# Patient Record
Sex: MALE | Race: BLACK OR AFRICAN AMERICAN | NOT HISPANIC OR LATINO | Employment: OTHER | URBAN - METROPOLITAN AREA
[De-identification: names, ages, dates, MRNs, and addresses within clinical notes are randomized per-mention and may not be internally consistent; named-entity substitution may affect disease eponyms.]

---

## 2017-09-09 ENCOUNTER — HOSPITAL ENCOUNTER (OUTPATIENT)
Facility: HOSPITAL | Age: 57
Discharge: HOME | End: 2017-09-10
Attending: EMERGENCY MEDICINE | Admitting: INTERNAL MEDICINE
Payer: MEDICARE

## 2017-09-09 DIAGNOSIS — R56.9 NEW ONSET SEIZURE: Primary | ICD-10-CM

## 2017-09-09 DIAGNOSIS — R56.9 SEIZURE: ICD-10-CM

## 2017-09-09 DIAGNOSIS — F12.10 MARIJUANA ABUSE: ICD-10-CM

## 2017-09-09 DIAGNOSIS — Z77.29 CARBON MONOXIDE EXPOSURE: ICD-10-CM

## 2017-09-09 DIAGNOSIS — F10.10 ALCOHOL ABUSE: ICD-10-CM

## 2017-09-09 LAB
ALBUMIN SERPL BCP-MCNC: 3.9 G/DL
ALLENS TEST: ABNORMAL
ALP SERPL-CCNC: 53 U/L
ALT SERPL W/O P-5'-P-CCNC: 21 U/L
AMORPH CRY UR QL COMP ASSIST: NORMAL
AMPHET+METHAMPHET UR QL: NEGATIVE
ANION GAP SERPL CALC-SCNC: 17 MMOL/L
APAP SERPL-MCNC: <3 UG/ML
AST SERPL-CCNC: 23 U/L
BACTERIA #/AREA URNS AUTO: NORMAL /HPF
BARBITURATES UR QL SCN>200 NG/ML: NEGATIVE
BASOPHILS # BLD AUTO: 0.02 K/UL
BASOPHILS NFR BLD: 0.2 %
BENZODIAZ UR QL SCN>200 NG/ML: NEGATIVE
BILIRUB SERPL-MCNC: 0.6 MG/DL
BILIRUB UR QL STRIP: NEGATIVE
BUN SERPL-MCNC: 7 MG/DL
BZE UR QL SCN: NEGATIVE
CALCIUM SERPL-MCNC: 9.5 MG/DL
CANNABINOIDS UR QL SCN: NORMAL
CHLORIDE SERPL-SCNC: 98 MMOL/L
CLARITY UR REFRACT.AUTO: ABNORMAL
CO2 SERPL-SCNC: 20 MMOL/L
COLOR UR AUTO: YELLOW
CREAT SERPL-MCNC: 1 MG/DL
CREAT UR-MCNC: 72.5 MG/DL
DELSYS: ABNORMAL
DIFFERENTIAL METHOD: ABNORMAL
EOSINOPHIL # BLD AUTO: 0.2 K/UL
EOSINOPHIL NFR BLD: 2 %
ERYTHROCYTE [DISTWIDTH] IN BLOOD BY AUTOMATED COUNT: 11.9 %
EST. GFR  (AFRICAN AMERICAN): >60 ML/MIN/1.73 M^2
EST. GFR  (NON AFRICAN AMERICAN): >60 ML/MIN/1.73 M^2
ETHANOL SERPL-MCNC: <10 MG/DL
FLOW: 3
GLUCOSE SERPL-MCNC: 106 MG/DL
GLUCOSE UR QL STRIP: NEGATIVE
HCO3 UR-SCNC: 23 MMOL/L (ref 24–28)
HCT VFR BLD AUTO: 36 %
HGB BLD-MCNC: 12.2 G/DL
HGB UR QL STRIP: ABNORMAL
KETONES UR QL STRIP: NEGATIVE
LEUKOCYTE ESTERASE UR QL STRIP: NEGATIVE
LYMPHOCYTES # BLD AUTO: 2.4 K/UL
LYMPHOCYTES NFR BLD: 28.2 %
MCH RBC QN AUTO: 29.5 PG
MCHC RBC AUTO-ENTMCNC: 33.9 G/DL
MCV RBC AUTO: 87 FL
METHADONE UR QL SCN>300 NG/ML: NEGATIVE
MICROSCOPIC COMMENT: NORMAL
MODE: ABNORMAL
MONOCYTES # BLD AUTO: 0.7 K/UL
MONOCYTES NFR BLD: 8.4 %
NEUTROPHILS # BLD AUTO: 5.1 K/UL
NEUTROPHILS NFR BLD: 61 %
NITRITE UR QL STRIP: NEGATIVE
OPIATES UR QL SCN: NEGATIVE
PCO2 BLDA: 39.6 MMHG (ref 35–45)
PCP UR QL SCN>25 NG/ML: NEGATIVE
PH SMN: 7.37 [PH] (ref 7.35–7.45)
PH UR STRIP: 5 [PH] (ref 5–8)
PLATELET # BLD AUTO: 369 K/UL
PMV BLD AUTO: 9.9 FL
PO2 BLDA: 108 MMHG (ref 80–100)
POC BE: -2 MMOL/L
POC SATURATED O2: 98 % (ref 95–100)
POTASSIUM SERPL-SCNC: 3.8 MMOL/L
PROT SERPL-MCNC: 7.9 G/DL
PROT UR QL STRIP: ABNORMAL
RBC # BLD AUTO: 4.14 M/UL
RBC #/AREA URNS AUTO: 2 /HPF (ref 0–4)
SAMPLE: ABNORMAL
SITE: ABNORMAL
SODIUM SERPL-SCNC: 135 MMOL/L
SP GR UR STRIP: 1.02 (ref 1–1.03)
TOXICOLOGY INFORMATION: NORMAL
TROPONIN I SERPL DL<=0.01 NG/ML-MCNC: 0.01 NG/ML
TROPONIN I SERPL DL<=0.01 NG/ML-MCNC: <0.006 NG/ML
TSH SERPL DL<=0.005 MIU/L-ACNC: 0.74 UIU/ML
URN SPEC COLLECT METH UR: ABNORMAL
UROBILINOGEN UR STRIP-ACNC: NEGATIVE EU/DL
WBC # BLD AUTO: 8.43 K/UL
WBC #/AREA URNS AUTO: 1 /HPF (ref 0–5)

## 2017-09-09 PROCEDURE — 94760 N-INVAS EAR/PLS OXIMETRY 1: CPT | Performed by: GENERAL PRACTICE

## 2017-09-09 PROCEDURE — 63600175 PHARM REV CODE 636 W HCPCS: Performed by: EMERGENCY MEDICINE

## 2017-09-09 PROCEDURE — 84443 ASSAY THYROID STIM HORMONE: CPT

## 2017-09-09 PROCEDURE — 36600 WITHDRAWAL OF ARTERIAL BLOOD: CPT | Performed by: GENERAL PRACTICE

## 2017-09-09 PROCEDURE — 25000003 PHARM REV CODE 250: Performed by: EMERGENCY MEDICINE

## 2017-09-09 PROCEDURE — 93005 ELECTROCARDIOGRAM TRACING: CPT | Performed by: GENERAL PRACTICE

## 2017-09-09 PROCEDURE — 27000221 HC OXYGEN, UP TO 24 HOURS: Performed by: GENERAL PRACTICE

## 2017-09-09 PROCEDURE — 80053 COMPREHEN METABOLIC PANEL: CPT

## 2017-09-09 PROCEDURE — G0378 HOSPITAL OBSERVATION PER HR: HCPCS

## 2017-09-09 PROCEDURE — 93010 ELECTROCARDIOGRAM REPORT: CPT | Mod: ,,, | Performed by: NUCLEAR MEDICINE

## 2017-09-09 PROCEDURE — 85025 COMPLETE CBC W/AUTO DIFF WBC: CPT

## 2017-09-09 PROCEDURE — 27100108: Performed by: GENERAL PRACTICE

## 2017-09-09 PROCEDURE — 96367 TX/PROPH/DG ADDL SEQ IV INF: CPT

## 2017-09-09 PROCEDURE — 80320 DRUG SCREEN QUANTALCOHOLS: CPT

## 2017-09-09 PROCEDURE — 99900035 HC TECH TIME PER 15 MIN (STAT): Performed by: GENERAL PRACTICE

## 2017-09-09 PROCEDURE — 81000 URINALYSIS NONAUTO W/SCOPE: CPT

## 2017-09-09 PROCEDURE — 99900029 HC O2 SETUP (STAT): Performed by: GENERAL PRACTICE

## 2017-09-09 PROCEDURE — 80307 DRUG TEST PRSMV CHEM ANLYZR: CPT

## 2017-09-09 PROCEDURE — 80329 ANALGESICS NON-OPIOID 1 OR 2: CPT

## 2017-09-09 PROCEDURE — 84484 ASSAY OF TROPONIN QUANT: CPT

## 2017-09-09 PROCEDURE — 82803 BLOOD GASES ANY COMBINATION: CPT | Performed by: GENERAL PRACTICE

## 2017-09-09 PROCEDURE — 96365 THER/PROPH/DIAG IV INF INIT: CPT

## 2017-09-09 PROCEDURE — 96375 TX/PRO/DX INJ NEW DRUG ADDON: CPT

## 2017-09-09 PROCEDURE — 99285 EMERGENCY DEPT VISIT HI MDM: CPT | Mod: 25

## 2017-09-09 PROCEDURE — 93005 ELECTROCARDIOGRAM TRACING: CPT

## 2017-09-09 RX ORDER — ONDANSETRON 2 MG/ML
4 INJECTION INTRAMUSCULAR; INTRAVENOUS EVERY 12 HOURS PRN
Status: DISCONTINUED | OUTPATIENT
Start: 2017-09-09 | End: 2017-09-10 | Stop reason: HOSPADM

## 2017-09-09 RX ORDER — LORAZEPAM 2 MG/ML
INJECTION INTRAMUSCULAR
Status: DISPENSED
Start: 2017-09-09 | End: 2017-09-09

## 2017-09-09 RX ORDER — ENOXAPARIN SODIUM 100 MG/ML
40 INJECTION SUBCUTANEOUS EVERY 24 HOURS
Status: DISCONTINUED | OUTPATIENT
Start: 2017-09-09 | End: 2017-09-10 | Stop reason: HOSPADM

## 2017-09-09 RX ORDER — SODIUM CHLORIDE 9 MG/ML
1000 INJECTION, SOLUTION INTRAVENOUS
Status: DISCONTINUED | OUTPATIENT
Start: 2017-09-09 | End: 2017-09-09

## 2017-09-09 RX ORDER — SODIUM CHLORIDE 9 MG/ML
INJECTION, SOLUTION INTRAVENOUS CONTINUOUS
Status: DISCONTINUED | OUTPATIENT
Start: 2017-09-09 | End: 2017-09-10 | Stop reason: HOSPADM

## 2017-09-09 RX ADMIN — ENOXAPARIN SODIUM 40 MG: 100 INJECTION SUBCUTANEOUS at 06:09

## 2017-09-09 RX ADMIN — ASCORBIC ACID, VITAMIN A PALMITATE, CHOLECALCIFEROL, THIAMINE HYDROCHLORIDE, RIBOFLAVIN-5 PHOSPHATE SODIUM, PYRIDOXINE HYDROCHLORIDE, NIACINAMIDE, DEXPANTHENOL, ALPHA-TOCOPHEROL ACETATE, VITAMIN K1, FOLIC ACID, BIOTIN, CYANOCOBALAMIN: 200; 3300; 200; 6; 3.6; 6; 40; 15; 10; 150; 600; 60; 5 INJECTION, SOLUTION INTRAVENOUS at 09:09

## 2017-09-09 RX ADMIN — LORAZEPAM 2 MG: 2 INJECTION INTRAMUSCULAR; INTRAVENOUS at 08:09

## 2017-09-09 RX ADMIN — SODIUM CHLORIDE: 0.9 INJECTION, SOLUTION INTRAVENOUS at 05:09

## 2017-09-09 RX ADMIN — THIAMINE HYDROCHLORIDE 100 MG: 100 INJECTION, SOLUTION INTRAMUSCULAR; INTRAVENOUS at 09:09

## 2017-09-09 RX ADMIN — FOLIC ACID 1 MG: 5 INJECTION, SOLUTION INTRAMUSCULAR; INTRAVENOUS; SUBCUTANEOUS at 09:09

## 2017-09-09 RX ADMIN — SODIUM CHLORIDE: 0.9 INJECTION, SOLUTION INTRAVENOUS at 12:09

## 2017-09-09 NOTE — ASSESSMENT & PLAN NOTE
Doubt any real CO poisoning  Pt has been on O2 NRB for last 6-8 hours, no point in repeating ABG to check for Carboxyhemoglobin level.

## 2017-09-09 NOTE — ED NOTES
Pt sleeping in bed awakens to voice and is oriented. NAD, VSS, RR equal and unlabored. Will continue to monitor

## 2017-09-09 NOTE — ED NOTES
MD at bedside talking to pt and family member. MD states pt started to stare off and eyes deviated to left and pt began having a seizure. 2mg verbal order for ativan IV. Resp at bedside with ambu bag and suction

## 2017-09-09 NOTE — SUBJECTIVE & OBJECTIVE
Past Medical History:   Diagnosis Date    GERD (gastroesophageal reflux disease)     Hypertension        History reviewed. No pertinent surgical history.    Review of patient's allergies indicates:  No Known Allergies    No current facility-administered medications on file prior to encounter.      Current Outpatient Prescriptions on File Prior to Encounter   Medication Sig    fluticasone 50 mcg/actuation DsDv Inhale into the lungs.    hydrocodone-acetaminophen 7.5-325mg (NORCO) 7.5-325 mg per tablet Take 1 tablet by mouth every 6 (six) hours as needed for Pain.    lisinopril (PRINIVIL,ZESTRIL) 5 MG tablet Take 5 mg by mouth once daily.    naproxen (NAPROSYN) 500 MG tablet Take 500 mg by mouth 2 (two) times daily.    omeprazole (PRILOSEC) 20 MG capsule Take 20 mg by mouth once daily.     Family History     None        Social History Main Topics    Smoking status: Current Every Day Smoker     Packs/day: 0.50    Smokeless tobacco: Never Used    Alcohol use Yes      Comment: daily     Drug use: Unknown    Sexual activity: Not on file     Review of Systems   Constitutional: Positive for activity change. Negative for appetite change, diaphoresis, fatigue and fever.   HENT: Negative.    Eyes: Negative.    Respiratory: Negative for cough, choking, shortness of breath and wheezing.    Cardiovascular: Negative for chest pain and leg swelling.   Gastrointestinal: Negative for abdominal distention, abdominal pain, nausea and vomiting.   Endocrine: Negative.    Genitourinary: Negative.    Musculoskeletal: Positive for gait problem. Negative for arthralgias and back pain.   Skin: Negative.    Neurological: Positive for seizures and weakness. Negative for syncope.   Psychiatric/Behavioral: Positive for confusion and decreased concentration. Negative for self-injury and suicidal ideas.     Objective:     Vital Signs (Most Recent):  Temp: 98.1 °F (36.7 °C) (09/09/17 1736)  Pulse: 83 (09/09/17 1736)  Resp: 15 (09/09/17  1736)  BP: (!) 147/70 (09/09/17 1736)  SpO2: 100 % (09/09/17 1736) Vital Signs (24h Range):  Temp:  [97.6 °F (36.4 °C)-98.1 °F (36.7 °C)] 98.1 °F (36.7 °C)  Pulse:  [] 83  Resp:  [15-22] 15  SpO2:  [96 %-100 %] 100 %  BP: (115-164)/(70-97) 147/70     Weight: 61.7 kg (136 lb)  Body mass index is 20.08 kg/m².    Physical Exam   Constitutional: He is oriented to person, place, and time. He appears well-developed and well-nourished. No distress.   HENT:   Head: Normocephalic and atraumatic.   Mouth/Throat: Oropharynx is clear and moist.   Eyes: Conjunctivae and EOM are normal. Pupils are equal, round, and reactive to light.   Neck: Normal range of motion. Neck supple. No JVD present.   Cardiovascular: Normal rate, regular rhythm, normal heart sounds and intact distal pulses.  Exam reveals no friction rub.    No murmur heard.  Pulmonary/Chest: Effort normal and breath sounds normal. He has no wheezes. He has no rales.   Abdominal: Soft. Bowel sounds are normal. He exhibits distension. There is no tenderness.   Musculoskeletal: Normal range of motion.   Lymphadenopathy:     He has no cervical adenopathy.   Neurological: He is alert and oriented to person, place, and time.   Skin: Skin is warm and dry. Capillary refill takes less than 2 seconds. He is not diaphoretic.   Psychiatric: He has a normal mood and affect. His behavior is normal. Thought content normal.   Nursing note and vitals reviewed.    Results for orders placed or performed during the hospital encounter of 09/09/17   CBC auto differential   Result Value Ref Range    WBC 8.43 3.90 - 12.70 K/uL    RBC 4.14 (L) 4.60 - 6.20 M/uL    Hemoglobin 12.2 (L) 14.0 - 18.0 g/dL    Hematocrit 36.0 (L) 40.0 - 54.0 %    MCV 87 82 - 98 fL    MCH 29.5 27.0 - 31.0 pg    MCHC 33.9 32.0 - 36.0 g/dL    RDW 11.9 11.5 - 14.5 %    Platelets 369 (H) 150 - 350 K/uL    MPV 9.9 9.2 - 12.9 fL    Gran # 5.1 1.8 - 7.7 K/uL    Lymph # 2.4 1.0 - 4.8 K/uL    Mono # 0.7 0.3 - 1.0 K/uL     Eos # 0.2 0.0 - 0.5 K/uL    Baso # 0.02 0.00 - 0.20 K/uL    Gran% 61.0 38.0 - 73.0 %    Lymph% 28.2 18.0 - 48.0 %    Mono% 8.4 4.0 - 15.0 %    Eosinophil% 2.0 0.0 - 8.0 %    Basophil% 0.2 0.0 - 1.9 %    Differential Method Automated    Comprehensive metabolic panel   Result Value Ref Range    Sodium 135 (L) 136 - 145 mmol/L    Potassium 3.8 3.5 - 5.1 mmol/L    Chloride 98 95 - 110 mmol/L    CO2 20 (L) 23 - 29 mmol/L    Glucose 106 70 - 110 mg/dL    BUN, Bld 7 6 - 20 mg/dL    Creatinine 1.0 0.5 - 1.4 mg/dL    Calcium 9.5 8.7 - 10.5 mg/dL    Total Protein 7.9 6.0 - 8.4 g/dL    Albumin 3.9 3.5 - 5.2 g/dL    Total Bilirubin 0.6 0.1 - 1.0 mg/dL    Alkaline Phosphatase 53 (L) 55 - 135 U/L    AST 23 10 - 40 U/L    ALT 21 10 - 44 U/L    Anion Gap 17 (H) 8 - 16 mmol/L    eGFR if African American >60.0 >60 mL/min/1.73 m^2    eGFR if non African American >60.0 >60 mL/min/1.73 m^2   TSH   Result Value Ref Range    TSH 0.739 0.400 - 4.000 uIU/mL   Urinalysis - clean catch   Result Value Ref Range    Specimen UA Urine, Catheterized     Color, UA Yellow Yellow, Straw, Neda    Appearance, UA Hazy (A) Clear    pH, UA 5.0 5.0 - 8.0    Specific Gravity, UA 1.025 1.005 - 1.030    Protein, UA Trace (A) Negative    Glucose, UA Negative Negative    Ketones, UA Negative Negative    Bilirubin (UA) Negative Negative    Occult Blood UA 2+ (A) Negative    Nitrite, UA Negative Negative    Urobilinogen, UA Negative <2.0 EU/dL    Leukocytes, UA Negative Negative   Drug screen panel, emergency   Result Value Ref Range    Benzodiazepines Negative     Methadone metabolites Negative     Cocaine (Metab.) Negative     Opiate Scrn, Ur Negative     Barbiturate Screen, Ur Negative     Amphetamine Screen, Ur Negative     THC Presumptive Positive     Phencyclidine Negative     Creatinine, Random Ur 72.5 23.0 - 375.0 mg/dL    Toxicology Information SEE COMMENT    Ethanol   Result Value Ref Range    Alcohol, Medical, Serum <10 <10 mg/dL   Acetaminophen level    Result Value Ref Range    Acetaminophen (Tylenol), Serum <3.0 (L) 10.0 - 20.0 ug/mL   Troponin I #1   Result Value Ref Range    Troponin I <0.006 0.000 - 0.026 ng/mL   Urinalysis Microscopic   Result Value Ref Range    RBC, UA 2 0 - 4 /hpf    WBC, UA 1 0 - 5 /hpf    Bacteria, UA Occasional None-Occ /hpf    Amorphous, UA Few None-Moderate    Microscopic Comment SEE COMMENT    Troponin I #2   Result Value Ref Range    Troponin I 0.012 0.000 - 0.026 ng/mL   ISTAT PROCEDURE   Result Value Ref Range    POC PH 7.371 7.35 - 7.45    POC PCO2 39.6 35 - 45 mmHg    POC PO2 108 (H) 80 - 100 mmHg    POC HCO3 23.0 (L) 24 - 28 mmol/L    POC BE -2 -2 to 2 mmol/L    POC SATURATED O2 98 95 - 100 %    Sample ARTERIAL     Site RR     Allens Test Pass     DelSys Nasal Can     Mode SPONT     Flow 3       Significant Labs: All pertinent labs within the past 24 hours have been reviewed.  Imaging Results          CT Cervical Spine Without Contrast (Final result)  Result time 09/09/17 09:15:23    Final result by Selwyn Guajardo Jr., MD (09/09/17 09:15:23)                 Impression:      No acute findings.  Chronic changes, as above.    All CT scans at this facility use dose modulation, iterative reconstruction, and/or weight based dosing when appropriate to reduce radiation dose to as low as reasonably achievable.      Electronically signed by: SELWYN GUAJARDO MD  Date:     09/09/17  Time:    09:15              Narrative:    EXAM:   CT CERVICAL SPINE WITHOUT CONTRAST    CLINICAL HISTORY:  seizure      COMPARISON:  None    TECHNIQUE: Noncontrast axial images of the cervical spine were obtained.  Multiplanar reconstruction images were obtained.    FINDINGS:   Overall alignment is satisfactory.  No vertebral body fractures.  No dislocations.  No paraspinal masses.  Visualized upper chest and neck soft tissues appear unremarkable.    Odontoid appears intact.  Lateral masses appear symmetric.    Posterior element degenerative changes, greater  on the right at C3-C4 and C4-C5.  Mild foraminal narrowing on the right at C3-C4 and C4-C5.    No findings of severe central canal stenosis.  No obvious focal herniations or disc bulging.                             CT Head Without Contrast (Final result)  Result time 09/09/17 08:50:22    Final result by Jason Monroe MD (09/09/17 08:50:22)                 Impression:         Atrophy.  No evidence of an abnormal soft tissue mass.  No evidence of intra-or extra-axial hemorrhage.  No evidence of acute ischemic changes.    All CT scans at this facility use dose modulation, iterative reconstruction, and/or weight based dosing when appropriate to reduce radiation dose to as low as reasonably achievable.       Electronically signed by: JASON MONROE MD  Date:     09/09/17  Time:    08:50              Narrative:    Exam: CT scan of the head without contrast    Clinical History:  R56.9 Unspecified convulsions.      Findings:    Mild diffuse cerebral atrophy. The gray-white matter differentiation is normal. No evidence of intra-or extra-axial hemorrhage.  No abnormal mass.  No evidence of skull fracture. The visualized sinuses and mastoid air cells are clear. No osseous abnormality.                             X-Ray Chest AP Portable (Final result)  Result time 09/09/17 08:33:05    Final result by Jason Monroe MD (09/09/17 08:33:05)                 Impression:     No acute cardiopulmonary disease.      Electronically signed by: JASON MONROE MD  Date:     09/09/17  Time:    08:33              Narrative:    Exam: Portable chest radiograph    Clinical History:   syncope.    Findings:     The lungs are clear. Heart size upper limits of normal.  Mildly tortuous thoracic aorta.                            Significant Imaging: I have reviewed all pertinent imaging results/findings within the past 24 hours.

## 2017-09-09 NOTE — ED NOTES
Patient placed on continuous cardiac monitor, automatic blood pressure cuff and continuous pulse oximeter. 3L NC per MD

## 2017-09-09 NOTE — ED NOTES
Pt now post ictal and had episode of urinary incontinence. Pt changed into gown. Fall band placed and seizure precautions in place

## 2017-09-09 NOTE — ASSESSMENT & PLAN NOTE
Must quit, counseling done  Continue Thiamine, Folbic and MVI  May use Librium upon discharge to prevent DTs

## 2017-09-09 NOTE — ED PROVIDER NOTES
Encounter Date: 9/9/2017       History     Chief Complaint   Patient presents with    Loss of Consciousness     The history is provided by the patient, the spouse and the EMS personnel.   Seizures    This is a new problem. The current episode started just prior to arrival. The problem has been waxing and waning (pt had unwitnessed fall this morning and during exam in ER pt had generalized tonic clonic seizure which lasted less than 5 mins.  pt given ativan 2mg IV x 1 and seizure resolved.  wife states pt is a heavy drinker and has been trying to cut back on etoh.). There was 1 seizure. The most recent episode lasted 2 to 5 minutes. Associated symptoms include sleepiness and confusion. Pertinent negatives include no headaches, no speech difficulty, no visual disturbance, no neck stiffness, no sore throat, no chest pain, no cough, no nausea, no vomiting, no diarrhea and no muscle weakness. Characteristics include eye blinking, eye deviation (to the left), bladder incontinence, rhythmic jerking, loss of consciousness and bit tongue. Characteristics do not include bowel incontinence, apnea or cyanosis. witnessed here in ER The episode was not witnessed (initial seizure was not witnessed at home.  wife heard a loud noise in kitchen around 7am (less than one hour ago) and found pt on kitchen floor.  pt denied any pain prior to having witnessed seizure in ER.). There was no sensation of an aura present. The seizures continued in the ED. The seizure(s) had no focality. Possible causes include change in alcohol use (wife states he normally drinks about 12 beers a day, but has been trying to cut back over the past week). Possible causes do not include medication or dosage change, sleep deprivation, missed seizure meds or recent illness. Medications administered prior to arrival include lorazepam IV.     Also, per EMS, pt was found in kitchen cooking on propane grill inside.  No one else in house c/o HA or other  symptoms.    Review of patient's allergies indicates:  No Known Allergies  Past Medical History:   Diagnosis Date    GERD (gastroesophageal reflux disease)     Hypertension      History reviewed. No pertinent surgical history.  History reviewed. No pertinent family history.  Social History   Substance Use Topics    Smoking status: Current Every Day Smoker     Packs/day: 0.50    Smokeless tobacco: Never Used    Alcohol use Yes      Comment: daily      Review of Systems   Constitutional: Negative for fever.   HENT: Negative for sore throat.    Eyes: Negative for visual disturbance.   Respiratory: Negative for apnea, cough and shortness of breath.    Cardiovascular: Negative for chest pain and cyanosis.   Gastrointestinal: Negative for bowel incontinence, diarrhea, nausea and vomiting.   Genitourinary: Positive for bladder incontinence. Negative for dysuria.   Musculoskeletal: Negative for back pain.   Skin: Negative for rash.   Neurological: Positive for seizures and loss of consciousness. Negative for speech difficulty, weakness and headaches.   Hematological: Does not bruise/bleed easily.   Psychiatric/Behavioral: Positive for confusion.   All other systems reviewed and are negative.      Physical Exam     Initial Vitals [09/09/17 0751]   BP Pulse Resp Temp SpO2   137/85 93 18 97.6 °F (36.4 °C) 100 %      MAP       102.33         Physical Exam    Nursing note and vitals reviewed.  Constitutional: He appears well-developed and well-nourished. He is not diaphoretic. No distress.   HENT:   Head: Normocephalic and atraumatic.   Eyes: EOM are normal. Pupils are equal, round, and reactive to light. No scleral icterus.   Neck: Normal range of motion. Neck supple. No thyromegaly present.   Cardiovascular: Normal rate, regular rhythm, normal heart sounds and intact distal pulses. Exam reveals no gallop and no friction rub.    No murmur heard.  Pulmonary/Chest: Breath sounds normal. No respiratory distress. He has no  wheezes. He has no rhonchi. He exhibits no tenderness.   Abdominal: Soft. Bowel sounds are normal. He exhibits no distension. There is no tenderness. There is no rebound and no guarding.   Musculoskeletal: Normal range of motion. He exhibits no edema or tenderness.   Lymphadenopathy:     He has no cervical adenopathy.   Neurological: He is alert and oriented to person, place, and time. He has normal strength. No cranial nerve deficit or sensory deficit.   Skin: Skin is warm and dry.   Psychiatric: He has a normal mood and affect. His speech is normal. Judgment and thought content normal. He is slowed (pt has recieved 2mg of ativan ). He expresses no homicidal and no suicidal ideation. He expresses no suicidal plans and no homicidal plans. He exhibits abnormal recent memory (pt does not remember seizure or any aura prior to seizure.).         ED Course   Procedures  Labs Reviewed   CBC W/ AUTO DIFFERENTIAL - Abnormal; Notable for the following:        Result Value    RBC 4.14 (*)     Hemoglobin 12.2 (*)     Hematocrit 36.0 (*)     Platelets 369 (*)     All other components within normal limits   COMPREHENSIVE METABOLIC PANEL - Abnormal; Notable for the following:     Sodium 135 (*)     CO2 20 (*)     Alkaline Phosphatase 53 (*)     Anion Gap 17 (*)     All other components within normal limits   URINALYSIS - Abnormal; Notable for the following:     Appearance, UA Hazy (*)     Protein, UA Trace (*)     Occult Blood UA 2+ (*)     All other components within normal limits   ACETAMINOPHEN LEVEL - Abnormal; Notable for the following:     Acetaminophen (Tylenol), Serum <3.0 (*)     All other components within normal limits   ISTAT PROCEDURE - Abnormal; Notable for the following:     POC PO2 108 (*)     POC HCO3 23.0 (*)     All other components within normal limits   TSH   DRUG SCREEN PANEL, URINE EMERGENCY   ALCOHOL,MEDICAL (ETHANOL)   TROPONIN I   URINALYSIS MICROSCOPIC   TROPONIN I     EKG Readings: (Independently  "Interpreted)   Initial Reading: No STEMI. Rhythm: Normal Sinus Rhythm. Heart Rate: 98. Ectopy: No Ectopy. Conduction: Normal. ST Segments: Normal ST Segments. T Waves: Normal. Axis: Left Axis Deviation. Clinical Impression: Normal Sinus Rhythm             Vitals:    09/09/17 0751 09/09/17 0804 09/09/17 0806 09/09/17 0956   BP: 137/85  (!) 164/97 123/75   Pulse: 93 104 104 83   Resp: 18  (!) 22 18   Temp: 97.6 °F (36.4 °C)      TempSrc: Oral      SpO2: 100% 97% 96% 100%   Weight: 61.2 kg (135 lb)      Height: 5' 9" (1.753 m)       09/09/17 1009 09/09/17 1132 09/09/17 1302 09/09/17 1432   BP:  121/71 117/72 115/76   Pulse:  89 81 76   Resp:  19 16 16   Temp:       TempSrc:       SpO2: 100% 100% 100% 100%   Weight:       Height:        09/09/17 1440 09/09/17 1532   BP:  139/79   Pulse:  74   Resp:  15   Temp: 98.1 °F (36.7 °C)    TempSrc: Oral    SpO2:  100%   Weight:     Height:         Results for orders placed or performed during the hospital encounter of 09/09/17   CBC auto differential   Result Value Ref Range    WBC 8.43 3.90 - 12.70 K/uL    RBC 4.14 (L) 4.60 - 6.20 M/uL    Hemoglobin 12.2 (L) 14.0 - 18.0 g/dL    Hematocrit 36.0 (L) 40.0 - 54.0 %    MCV 87 82 - 98 fL    MCH 29.5 27.0 - 31.0 pg    MCHC 33.9 32.0 - 36.0 g/dL    RDW 11.9 11.5 - 14.5 %    Platelets 369 (H) 150 - 350 K/uL    MPV 9.9 9.2 - 12.9 fL    Gran # 5.1 1.8 - 7.7 K/uL    Lymph # 2.4 1.0 - 4.8 K/uL    Mono # 0.7 0.3 - 1.0 K/uL    Eos # 0.2 0.0 - 0.5 K/uL    Baso # 0.02 0.00 - 0.20 K/uL    Gran% 61.0 38.0 - 73.0 %    Lymph% 28.2 18.0 - 48.0 %    Mono% 8.4 4.0 - 15.0 %    Eosinophil% 2.0 0.0 - 8.0 %    Basophil% 0.2 0.0 - 1.9 %    Differential Method Automated    Comprehensive metabolic panel   Result Value Ref Range    Sodium 135 (L) 136 - 145 mmol/L    Potassium 3.8 3.5 - 5.1 mmol/L    Chloride 98 95 - 110 mmol/L    CO2 20 (L) 23 - 29 mmol/L    Glucose 106 70 - 110 mg/dL    BUN, Bld 7 6 - 20 mg/dL    Creatinine 1.0 0.5 - 1.4 mg/dL    Calcium 9.5 " 8.7 - 10.5 mg/dL    Total Protein 7.9 6.0 - 8.4 g/dL    Albumin 3.9 3.5 - 5.2 g/dL    Total Bilirubin 0.6 0.1 - 1.0 mg/dL    Alkaline Phosphatase 53 (L) 55 - 135 U/L    AST 23 10 - 40 U/L    ALT 21 10 - 44 U/L    Anion Gap 17 (H) 8 - 16 mmol/L    eGFR if African American >60.0 >60 mL/min/1.73 m^2    eGFR if non African American >60.0 >60 mL/min/1.73 m^2   TSH   Result Value Ref Range    TSH 0.739 0.400 - 4.000 uIU/mL   Urinalysis - clean catch   Result Value Ref Range    Specimen UA Urine, Catheterized     Color, UA Yellow Yellow, Straw, Neda    Appearance, UA Hazy (A) Clear    pH, UA 5.0 5.0 - 8.0    Specific Gravity, UA 1.025 1.005 - 1.030    Protein, UA Trace (A) Negative    Glucose, UA Negative Negative    Ketones, UA Negative Negative    Bilirubin (UA) Negative Negative    Occult Blood UA 2+ (A) Negative    Nitrite, UA Negative Negative    Urobilinogen, UA Negative <2.0 EU/dL    Leukocytes, UA Negative Negative   Drug screen panel, emergency   Result Value Ref Range    Benzodiazepines Negative     Methadone metabolites Negative     Cocaine (Metab.) Negative     Opiate Scrn, Ur Negative     Barbiturate Screen, Ur Negative     Amphetamine Screen, Ur Negative     THC Presumptive Positive     Phencyclidine Negative     Creatinine, Random Ur 72.5 23.0 - 375.0 mg/dL    Toxicology Information SEE COMMENT    Ethanol   Result Value Ref Range    Alcohol, Medical, Serum <10 <10 mg/dL   Acetaminophen level   Result Value Ref Range    Acetaminophen (Tylenol), Serum <3.0 (L) 10.0 - 20.0 ug/mL   Troponin I #1   Result Value Ref Range    Troponin I <0.006 0.000 - 0.026 ng/mL   Urinalysis Microscopic   Result Value Ref Range    RBC, UA 2 0 - 4 /hpf    WBC, UA 1 0 - 5 /hpf    Bacteria, UA Occasional None-Occ /hpf    Amorphous, UA Few None-Moderate    Microscopic Comment SEE COMMENT    Troponin I #2   Result Value Ref Range    Troponin I 0.012 0.000 - 0.026 ng/mL   ISTAT PROCEDURE   Result Value Ref Range    POC PH 7.371 7.35 -  7.45    POC PCO2 39.6 35 - 45 mmHg    POC PO2 108 (H) 80 - 100 mmHg    POC HCO3 23.0 (L) 24 - 28 mmol/L    POC BE -2 -2 to 2 mmol/L    POC SATURATED O2 98 95 - 100 %    Sample ARTERIAL     Site RR     Allens Test Pass     DelSys Nasal Can     Mode SPONT     Flow 3          Imaging Results          CT Cervical Spine Without Contrast (Final result)  Result time 09/09/17 09:15:23    Final result by Selwyn Guajardo Jr., MD (09/09/17 09:15:23)                 Impression:      No acute findings.  Chronic changes, as above.    All CT scans at this facility use dose modulation, iterative reconstruction, and/or weight based dosing when appropriate to reduce radiation dose to as low as reasonably achievable.      Electronically signed by: SELWYN GUAJARDO MD  Date:     09/09/17  Time:    09:15              Narrative:    EXAM:   CT CERVICAL SPINE WITHOUT CONTRAST    CLINICAL HISTORY:  seizure      COMPARISON:  None    TECHNIQUE: Noncontrast axial images of the cervical spine were obtained.  Multiplanar reconstruction images were obtained.    FINDINGS:   Overall alignment is satisfactory.  No vertebral body fractures.  No dislocations.  No paraspinal masses.  Visualized upper chest and neck soft tissues appear unremarkable.    Odontoid appears intact.  Lateral masses appear symmetric.    Posterior element degenerative changes, greater on the right at C3-C4 and C4-C5.  Mild foraminal narrowing on the right at C3-C4 and C4-C5.    No findings of severe central canal stenosis.  No obvious focal herniations or disc bulging.                             CT Head Without Contrast (Final result)  Result time 09/09/17 08:50:22    Final result by Jason Monroe MD (09/09/17 08:50:22)                 Impression:         Atrophy.  No evidence of an abnormal soft tissue mass.  No evidence of intra-or extra-axial hemorrhage.  No evidence of acute ischemic changes.    All CT scans at this facility use dose modulation, iterative reconstruction,  and/or weight based dosing when appropriate to reduce radiation dose to as low as reasonably achievable.       Electronically signed by: JASON MONROE MD  Date:     09/09/17  Time:    08:50              Narrative:    Exam: CT scan of the head without contrast    Clinical History:  R56.9 Unspecified convulsions.      Findings:    Mild diffuse cerebral atrophy. The gray-white matter differentiation is normal. No evidence of intra-or extra-axial hemorrhage.  No abnormal mass.  No evidence of skull fracture. The visualized sinuses and mastoid air cells are clear. No osseous abnormality.                             X-Ray Chest AP Portable (Final result)  Result time 09/09/17 08:33:05    Final result by Jason Monroe MD (09/09/17 08:33:05)                 Impression:     No acute cardiopulmonary disease.      Electronically signed by: JASON MONROE MD  Date:     09/09/17  Time:    08:33              Narrative:    Exam: Portable chest radiograph    Clinical History:   syncope.    Findings:     The lungs are clear. Heart size upper limits of normal.  Mildly tortuous thoracic aorta.                              Medications   lorazepam (ATIVAN) 2 mg/mL injection (not administered)   0.9%  NaCl infusion ( Intravenous New Bag 9/9/17 1218)   lorazepam (ATIVAN) injection 2 mg (2 mg Intravenous Given 9/9/17 0801)   sodium chloride 0.9% 1,000 mL with mvi, adult no.4 with vit K 3,300 unit- 150 mcg/10 mL 10 mL infusion ( Intravenous Stopped 9/9/17 1218)     And   folic acid 1 mg in sodium chloride 0.9% 100 mL IVPB (0 mg Intravenous Stopped 9/9/17 1124)     And   thiamine (B-1) 100 mg in dextrose 5 % 50 mL IVPB (0 mg Intravenous Stopped 9/9/17 1124)         9:28 AM  - Re-evaluation:  The patient is resting comfortably and is in no acute distress.  Pt back to baseline.  Again, no focal neurological deficits.  Pt denies any pain or injury. Discussed test results and notified of pending labs. Answered questions at this time.     11:41 AM -  Re-evaluation:  Discussed test results, shared treatment plan, and the need for admission with patient and family. They verbally understand and agree to the plan as discussed. Answered questions at this time.     11:41 AM - CONSULT: Dr. Sawyer discussed the case with Dr. Yanez. Agrees with current management. Recommends place in obs under Dr. Rudd, continue banana bag, and will see pt in hospital room.  Admitting Service: hospital medicine   Admitting Physician: Dr. Cavazos   Admit to obs tele    All historical, clinical, radiographic, and laboratory findings were reviewed with the patient/family in detail along with the indications for transport to the facility in [place] in order to receive cardiac monitoring, ivf and close observation.  All remaining questions and concerns were addressed at this time and the patient/family communicates understanding and agrees to proceed accordingly.  Similarly, all pertinent details of the encounter were discussed with Dr. Yanez who agrees to receive the patient at [facility] for Dr. Rudd for further care as outlined above.  The patient [facility] secondary to a need for ongoing cardiac monitoring and oxygen en route.  Buddy Sawyer MD  11:41 AM    Pre-hypertension/Hypertension: The pt has been informed that they may have pre-hypertension or hypertension based on a blood pressure reading in the ED. I recommend that the pt call the PCP listed on their discharge instructions or a physician of their choice this week to arrange f/u for further evaluation of possible pre-hypertension or hypertension.     Kahlil Grier was given a handout which discussed their disease process, precautions, and instructions for follow-up and therapy.        New Prescriptions    No medications on file          ED Diagnosis  1. New onset seizure    2. Seizure    3. Alcohol abuse    4. Marijuana abuse    5. Carbon monoxide exposure                           ED Course      Clinical Impression:   The primary encounter diagnosis was New onset seizure. Diagnoses of Seizure, Alcohol abuse, Marijuana abuse, and Carbon monoxide exposure were also pertinent to this visit.    Disposition:   Disposition: Placed in Observation  Condition: Alina Sawyer Jr., MD  09/09/17 6061

## 2017-09-09 NOTE — ASSESSMENT & PLAN NOTE
Pt has been trying to quit drinking and here actually denies any alcohol abuse.  Will continue IVF and watch closely, do neuro check q 4 hourly and discharge in am  No anti seizure meds needed at this point

## 2017-09-09 NOTE — PROGRESS NOTES
"Patient arrived to unit from [facility] ED via [facility].   Patient in room 234.  Transferred into bed with 2 assist.   Telephone report from Ariel PEREZ.  Charge nurse advised of patient arrival.   VS currently stable.   Tele monitored applied.   IVF infusing per order.  Seizure precautions initiated.  Patient oriented to room, rounding sheet and call bell.   Bed in lowest position, call light in reach.  Encouraged to notify of all needs.   Will continue to monitor.  BP (!) 147/70 (BP Location: Right arm, Patient Position: Lying)   Pulse 83   Temp 98.1 °F (36.7 °C) (Axillary)   Resp 15   Ht 5' 9" (1.753 m)   Wt 61.7 kg (136 lb)   SpO2 100%   BMI 20.08 kg/m²     "

## 2017-09-09 NOTE — HPI
Chief Complaint   Patient presents with    Loss of Consciousness        No Known Allergies    The history is provided by the patient, the spouse and the EMS personnel.     Seizures    This is a new problem. The current episode started just prior to arrival. The problem has been waxing and waning (pt had unwitnessed fall this morning and during exam in ER pt had generalized tonic clonic seizure which lasted less than 5 mins.  pt given ativan 2mg IV x 1 and seizure resolved.  wife states pt is a heavy drinker and has been trying to cut back on etoh.). There was 1 seizure. The most recent episode lasted 2 to 5 minutes. Associated symptoms include sleepiness and confusion. Pertinent negatives include no headaches, no speech difficulty, no visual disturbance, no neck stiffness, no sore throat, no chest pain, no cough, no nausea, no vomiting, no diarrhea and no muscle weakness. Characteristics include eye blinking, eye deviation (to the left), bladder incontinence, rhythmic jerking, loss of consciousness and bit tongue. Characteristics do not include bowel incontinence, apnea or cyanosis. witnessed here in ER The episode was not witnessed (initial seizure was not witnessed at home.  wife heard a loud noise in kitchen around 7am (less than one hour ago) and found pt on kitchen floor.  pt denied any pain prior to having witnessed seizure in ER.). There was no sensation of an aura present. The seizures continued in the ED. The seizure(s) had no focality. Possible causes include change in alcohol use (wife states he normally drinks about 12 beers a day, but has been trying to cut back over the past week). Possible causes do not include medication or dosage change, sleep deprivation, missed seizure meds or recent illness. Medications administered prior to arrival include lorazepam IV.      Also, per EMS, pt was found in kitchen cooking on propane grill inside.  No one else in house c/o HA or other symptoms.

## 2017-09-09 NOTE — ED NOTES
Respiratory Therapist at bedside with Ambu bag and suction. 02 sats dropped to 75% during seizure activity.  Administered ventilations with Ambu bag.  02 sats increased to 97%.  Patient resumed normal breathing patterns, placed patient on nasal cannula at 3 lpm.

## 2017-09-09 NOTE — H&P
[facility] Hospital Medicine  History & Physical    Patient Name: Kahlil Grier  MRN: [id number]  Admission Date: 9/9/2017  Attending Physician: Wilbur Rudd MD   Primary Care Provider: Kel Smiley MD         Patient information was obtained from patient, spouse/SO and ER records.     Subjective:     Principal Problem:New onset seizure    Chief Complaint:   Chief Complaint   Patient presents with    Loss of Consciousness        HPI:   Chief Complaint   Patient presents with    Loss of Consciousness        No Known Allergies    The history is provided by the patient, the spouse and the EMS personnel.     Seizures    This is a new problem. The current episode started just prior to arrival. The problem has been waxing and waning (pt had unwitnessed fall this morning and during exam in ER pt had generalized tonic clonic seizure which lasted less than 5 mins.  pt given ativan 2mg IV x 1 and seizure resolved.  wife states pt is a heavy drinker and has been trying to cut back on etoh.). There was 1 seizure. The most recent episode lasted 2 to 5 minutes. Associated symptoms include sleepiness and confusion. Pertinent negatives include no headaches, no speech difficulty, no visual disturbance, no neck stiffness, no sore throat, no chest pain, no cough, no nausea, no vomiting, no diarrhea and no muscle weakness. Characteristics include eye blinking, eye deviation (to the left), bladder incontinence, rhythmic jerking, loss of consciousness and bit tongue. Characteristics do not include bowel incontinence, apnea or cyanosis. witnessed here in ER The episode was not witnessed (initial seizure was not witnessed at home.  wife heard a loud noise in kitchen around 7am (less than one hour ago) and found pt on kitchen floor.  pt denied any pain prior to having witnessed seizure in ER.). There was no sensation of an aura present. The seizures continued in the ED. The seizure(s) had no focality. Possible  causes include change in alcohol use (wife states he normally drinks about 12 beers a day, but has been trying to cut back over the past week). Possible causes do not include medication or dosage change, sleep deprivation, missed seizure meds or recent illness. Medications administered prior to arrival include lorazepam IV.      Also, per EMS, pt was found in kitchen cooking on propane grill inside.  No one else in house c/o HA or other symptoms.             Past Medical History:   Diagnosis Date    GERD (gastroesophageal reflux disease)     Hypertension        History reviewed. No pertinent surgical history.    Review of patient's allergies indicates:  No Known Allergies    No current facility-administered medications on file prior to encounter.      Current Outpatient Prescriptions on File Prior to Encounter   Medication Sig    fluticasone 50 mcg/actuation DsDv Inhale into the lungs.    hydrocodone-acetaminophen 7.5-325mg (NORCO) 7.5-325 mg per tablet Take 1 tablet by mouth every 6 (six) hours as needed for Pain.    lisinopril (PRINIVIL,ZESTRIL) 5 MG tablet Take 5 mg by mouth once daily.    naproxen (NAPROSYN) 500 MG tablet Take 500 mg by mouth 2 (two) times daily.    omeprazole (PRILOSEC) 20 MG capsule Take 20 mg by mouth once daily.     Family History     None        Social History Main Topics    Smoking status: Current Every Day Smoker     Packs/day: 0.50    Smokeless tobacco: Never Used    Alcohol use Yes      Comment: daily     Drug use: Unknown    Sexual activity: Not on file     Review of Systems   Constitutional: Positive for activity change. Negative for appetite change, diaphoresis, fatigue and fever.   HENT: Negative.    Eyes: Negative.    Respiratory: Negative for cough, choking, shortness of breath and wheezing.    Cardiovascular: Negative for chest pain and leg swelling.   Gastrointestinal: Negative for abdominal distention, abdominal pain, nausea and vomiting.   Endocrine: Negative.     Genitourinary: Negative.    Musculoskeletal: Positive for gait problem. Negative for arthralgias and back pain.   Skin: Negative.    Neurological: Positive for seizures and weakness. Negative for syncope.   Psychiatric/Behavioral: Positive for confusion and decreased concentration. Negative for self-injury and suicidal ideas.     Objective:     Vital Signs (Most Recent):  Temp: 98.1 °F (36.7 °C) (09/09/17 1736)  Pulse: 83 (09/09/17 1736)  Resp: 15 (09/09/17 1736)  BP: (!) 147/70 (09/09/17 1736)  SpO2: 100 % (09/09/17 1736) Vital Signs (24h Range):  Temp:  [97.6 °F (36.4 °C)-98.1 °F (36.7 °C)] 98.1 °F (36.7 °C)  Pulse:  [] 83  Resp:  [15-22] 15  SpO2:  [96 %-100 %] 100 %  BP: (115-164)/(70-97) 147/70     Weight: 61.7 kg (136 lb)  Body mass index is 20.08 kg/m².    Physical Exam   Constitutional: He is oriented to person, place, and time. He appears well-developed and well-nourished. No distress.   HENT:   Head: Normocephalic and atraumatic.   Mouth/Throat: Oropharynx is clear and moist.   Eyes: Conjunctivae and EOM are normal. Pupils are equal, round, and reactive to light.   Neck: Normal range of motion. Neck supple. No JVD present.   Cardiovascular: Normal rate, regular rhythm, normal heart sounds and intact distal pulses.  Exam reveals no friction rub.    No murmur heard.  Pulmonary/Chest: Effort normal and breath sounds normal. He has no wheezes. He has no rales.   Abdominal: Soft. Bowel sounds are normal. He exhibits distension. There is no tenderness.   Musculoskeletal: Normal range of motion.   Lymphadenopathy:     He has no cervical adenopathy.   Neurological: He is alert and oriented to person, place, and time.   Skin: Skin is warm and dry. Capillary refill takes less than 2 seconds. He is not diaphoretic.   Psychiatric: He has a normal mood and affect. His behavior is normal. Thought content normal.   Nursing note and vitals reviewed.    Results for orders placed or performed during the hospital  encounter of 09/09/17   CBC auto differential   Result Value Ref Range    WBC 8.43 3.90 - 12.70 K/uL    RBC 4.14 (L) 4.60 - 6.20 M/uL    Hemoglobin 12.2 (L) 14.0 - 18.0 g/dL    Hematocrit 36.0 (L) 40.0 - 54.0 %    MCV 87 82 - 98 fL    MCH 29.5 27.0 - 31.0 pg    MCHC 33.9 32.0 - 36.0 g/dL    RDW 11.9 11.5 - 14.5 %    Platelets 369 (H) 150 - 350 K/uL    MPV 9.9 9.2 - 12.9 fL    Gran # 5.1 1.8 - 7.7 K/uL    Lymph # 2.4 1.0 - 4.8 K/uL    Mono # 0.7 0.3 - 1.0 K/uL    Eos # 0.2 0.0 - 0.5 K/uL    Baso # 0.02 0.00 - 0.20 K/uL    Gran% 61.0 38.0 - 73.0 %    Lymph% 28.2 18.0 - 48.0 %    Mono% 8.4 4.0 - 15.0 %    Eosinophil% 2.0 0.0 - 8.0 %    Basophil% 0.2 0.0 - 1.9 %    Differential Method Automated    Comprehensive metabolic panel   Result Value Ref Range    Sodium 135 (L) 136 - 145 mmol/L    Potassium 3.8 3.5 - 5.1 mmol/L    Chloride 98 95 - 110 mmol/L    CO2 20 (L) 23 - 29 mmol/L    Glucose 106 70 - 110 mg/dL    BUN, Bld 7 6 - 20 mg/dL    Creatinine 1.0 0.5 - 1.4 mg/dL    Calcium 9.5 8.7 - 10.5 mg/dL    Total Protein 7.9 6.0 - 8.4 g/dL    Albumin 3.9 3.5 - 5.2 g/dL    Total Bilirubin 0.6 0.1 - 1.0 mg/dL    Alkaline Phosphatase 53 (L) 55 - 135 U/L    AST 23 10 - 40 U/L    ALT 21 10 - 44 U/L    Anion Gap 17 (H) 8 - 16 mmol/L    eGFR if African American >60.0 >60 mL/min/1.73 m^2    eGFR if non African American >60.0 >60 mL/min/1.73 m^2   TSH   Result Value Ref Range    TSH 0.739 0.400 - 4.000 uIU/mL   Urinalysis - clean catch   Result Value Ref Range    Specimen UA Urine, Catheterized     Color, UA Yellow Yellow, Straw, Neda    Appearance, UA Hazy (A) Clear    pH, UA 5.0 5.0 - 8.0    Specific Gravity, UA 1.025 1.005 - 1.030    Protein, UA Trace (A) Negative    Glucose, UA Negative Negative    Ketones, UA Negative Negative    Bilirubin (UA) Negative Negative    Occult Blood UA 2+ (A) Negative    Nitrite, UA Negative Negative    Urobilinogen, UA Negative <2.0 EU/dL    Leukocytes, UA Negative Negative   Drug screen panel,  emergency   Result Value Ref Range    Benzodiazepines Negative     Methadone metabolites Negative     Cocaine (Metab.) Negative     Opiate Scrn, Ur Negative     Barbiturate Screen, Ur Negative     Amphetamine Screen, Ur Negative     THC Presumptive Positive     Phencyclidine Negative     Creatinine, Random Ur 72.5 23.0 - 375.0 mg/dL    Toxicology Information SEE COMMENT    Ethanol   Result Value Ref Range    Alcohol, Medical, Serum <10 <10 mg/dL   Acetaminophen level   Result Value Ref Range    Acetaminophen (Tylenol), Serum <3.0 (L) 10.0 - 20.0 ug/mL   Troponin I #1   Result Value Ref Range    Troponin I <0.006 0.000 - 0.026 ng/mL   Urinalysis Microscopic   Result Value Ref Range    RBC, UA 2 0 - 4 /hpf    WBC, UA 1 0 - 5 /hpf    Bacteria, UA Occasional None-Occ /hpf    Amorphous, UA Few None-Moderate    Microscopic Comment SEE COMMENT    Troponin I #2   Result Value Ref Range    Troponin I 0.012 0.000 - 0.026 ng/mL   ISTAT PROCEDURE   Result Value Ref Range    POC PH 7.371 7.35 - 7.45    POC PCO2 39.6 35 - 45 mmHg    POC PO2 108 (H) 80 - 100 mmHg    POC HCO3 23.0 (L) 24 - 28 mmol/L    POC BE -2 -2 to 2 mmol/L    POC SATURATED O2 98 95 - 100 %    Sample ARTERIAL     Site RR     Allens Test Pass     DelSys Nasal Can     Mode SPONT     Flow 3       Significant Labs: All pertinent labs within the past 24 hours have been reviewed.  Imaging Results          CT Cervical Spine Without Contrast (Final result)  Result time 09/09/17 09:15:23    Final result by Selwyn Guajardo Jr., MD (09/09/17 09:15:23)                 Impression:      No acute findings.  Chronic changes, as above.    All CT scans at this facility use dose modulation, iterative reconstruction, and/or weight based dosing when appropriate to reduce radiation dose to as low as reasonably achievable.      Electronically signed by: SELWYN GUAJARDO MD  Date:     09/09/17  Time:    09:15              Narrative:    EXAM:   CT CERVICAL SPINE WITHOUT  CONTRAST    CLINICAL HISTORY:  seizure      COMPARISON:  None    TECHNIQUE: Noncontrast axial images of the cervical spine were obtained.  Multiplanar reconstruction images were obtained.    FINDINGS:   Overall alignment is satisfactory.  No vertebral body fractures.  No dislocations.  No paraspinal masses.  Visualized upper chest and neck soft tissues appear unremarkable.    Odontoid appears intact.  Lateral masses appear symmetric.    Posterior element degenerative changes, greater on the right at C3-C4 and C4-C5.  Mild foraminal narrowing on the right at C3-C4 and C4-C5.    No findings of severe central canal stenosis.  No obvious focal herniations or disc bulging.                             CT Head Without Contrast (Final result)  Result time 09/09/17 08:50:22    Final result by Jason Monroe MD (09/09/17 08:50:22)                 Impression:         Atrophy.  No evidence of an abnormal soft tissue mass.  No evidence of intra-or extra-axial hemorrhage.  No evidence of acute ischemic changes.    All CT scans at this facility use dose modulation, iterative reconstruction, and/or weight based dosing when appropriate to reduce radiation dose to as low as reasonably achievable.       Electronically signed by: JASON MONROE MD  Date:     09/09/17  Time:    08:50              Narrative:    Exam: CT scan of the head without contrast    Clinical History:  R56.9 Unspecified convulsions.      Findings:    Mild diffuse cerebral atrophy. The gray-white matter differentiation is normal. No evidence of intra-or extra-axial hemorrhage.  No abnormal mass.  No evidence of skull fracture. The visualized sinuses and mastoid air cells are clear. No osseous abnormality.                             X-Ray Chest AP Portable (Final result)  Result time 09/09/17 08:33:05    Final result by Jason Monroe MD (09/09/17 08:33:05)                 Impression:     No acute cardiopulmonary disease.      Electronically signed by: JASON MONROE  MD  Date:     09/09/17  Time:    08:33              Narrative:    Exam: Portable chest radiograph    Clinical History:   syncope.    Findings:     The lungs are clear. Heart size upper limits of normal.  Mildly tortuous thoracic aorta.                            Significant Imaging: I have reviewed all pertinent imaging results/findings within the past 24 hours.    Assessment/Plan:     * alcohol withdrawal seizure    Pt has been trying to quit drinking and here actually denies any alcohol abuse.  Will continue IVF and watch closely, do neuro check q 4 hourly and discharge in am  No anti seizure meds needed at this point          Alcohol abuse    Must quit, counseling done  Continue Thiamine, Folbic and MVI  May use Librium upon discharge to prevent DTs          Carbon monoxide exposure    Doubt any real CO poisoning  Pt has been on O2 NRB for last 6-8 hours, no point in repeating ABG to check for Carboxyhemoglobin level.          Marijuana abuse    Must quit            VTE Risk Mitigation         Ordered     enoxaparin injection 40 mg  Daily     Route:  Subcutaneous        09/09/17 1733     Medium Risk of VTE  Once      09/09/17 1733     Place sequential compression device  Until discontinued      09/09/17 1733     Place TAMMI hose  Until discontinued      09/09/17 1733             Sarah Yanez MD  Department of Hospital Medicine   [facility]

## 2017-09-09 NOTE — ED NOTES
"Pt states he does not remember what happened this morning. Per family member pt was cooking breakfast and she heard a "thump" and he was on the floor. Per [facility] pt was cooking inside on an outdoor grill with propane. Pt states no pain and is AAOx3 and in NAD at this time. Per family pt drinks "lots of beer"  LOC: The patient is awake, alert and aware of environment with an appropriate affect, the patient is oriented x 3 and speaking appropriately.  APPEARANCE: Patient resting comfortably and in no acute distress, patient is clean and well groomed, patient's clothing is properly fastened.  HEENT: Brief WNL  SKIN: Brief WNL.   MUSCULOSKELETAL: Brief WNL  RESPIRATORY: Brief WNL  CARDIAC: Brief WNL  GASTRO: Brief WNL  : Brief WNL  Peripheral Vasc: Brief WNL  NEURO: Brief WNL  PSYCH: Brief WNL  "

## 2017-09-09 NOTE — HOSPITAL COURSE
Subsequently in the ER, he was suspected of Alcohol withdrawal Seizure and started on Banana bag and and also got a dose of Ativan IV for seizure. He was also suspected of Carbon Monoxide poisoning and started on Oxygen by NRB mask. ABG in the IB ER could not do any Carboxyhemoglobin level. Pt was accepted for over nite Observation and feels fine. No recurrence of seizure, denies any HA, SOB etc.    Pt with no additional seizure activity overnight. Reports he feels fine and is ready to go home. Patient was seen and examined today and deemed stable for discharge home. Discussed ETOH use and risks associated with daily beer intake. Pt reports he will not drink and will take MVI, Thiamine, and folbic for his health.

## 2017-09-10 VITALS
HEIGHT: 69 IN | SYSTOLIC BLOOD PRESSURE: 129 MMHG | BODY MASS INDEX: 20.14 KG/M2 | WEIGHT: 136 LBS | DIASTOLIC BLOOD PRESSURE: 67 MMHG | TEMPERATURE: 98 F | HEART RATE: 76 BPM | RESPIRATION RATE: 16 BRPM | OXYGEN SATURATION: 100 %

## 2017-09-10 PROCEDURE — G0378 HOSPITAL OBSERVATION PER HR: HCPCS

## 2017-09-10 RX ORDER — LANOLIN ALCOHOL/MO/W.PET/CERES
100 CREAM (GRAM) TOPICAL DAILY
Qty: 30 TABLET | Refills: 0 | Status: SHIPPED | OUTPATIENT
Start: 2017-09-10 | End: 2020-06-03

## 2017-09-10 NOTE — PLAN OF CARE
Problem: Patient Care Overview  Goal: Plan of Care Review  Outcome: Ongoing (interventions implemented as appropriate)  Pt AAOx4. No seizure activity this shift. No signs of DTs.  Seizure precautions in place.  Pt w/o any complaints.  NSR on monitor. VSS.   Pt maintained on NRB; O2 sat %  IVF per orders.  Voids per urinal. Patient turns independently in bed.   Fall precautions in place, bed alarm on.

## 2017-09-10 NOTE — PROGRESS NOTES
Pt discharge via w/c, no distress noted, no c/o pain, IV access removed, heart monitor removed, pt acknowledged understanding of rx to  at designated pharmacy.

## 2017-09-10 NOTE — DISCHARGE SUMMARY
[facility] Hospital Medicine  Discharge Summary      Patient Name: Kahlil Grier  MRN: [id number]  Admission Date: 9/9/2017  Hospital Length of Stay: 0 days  Discharge Date and Time:  09/10/2017 10:09 AM  Attending Physician: Wilbur Rudd MD   Discharging Provider: ZENIA Méndez  Primary Care Provider: Kel Smiley MD      HPI:   Chief Complaint   Patient presents with    Loss of Consciousness        No Known Allergies    The history is provided by the patient, the spouse and the EMS personnel.     Seizures    This is a new problem. The current episode started just prior to arrival. The problem has been waxing and waning (pt had unwitnessed fall this morning and during exam in ER pt had generalized tonic clonic seizure which lasted less than 5 mins.  pt given ativan 2mg IV x 1 and seizure resolved.  wife states pt is a heavy drinker and has been trying to cut back on etoh.). There was 1 seizure. The most recent episode lasted 2 to 5 minutes. Associated symptoms include sleepiness and confusion. Pertinent negatives include no headaches, no speech difficulty, no visual disturbance, no neck stiffness, no sore throat, no chest pain, no cough, no nausea, no vomiting, no diarrhea and no muscle weakness. Characteristics include eye blinking, eye deviation (to the left), bladder incontinence, rhythmic jerking, loss of consciousness and bit tongue. Characteristics do not include bowel incontinence, apnea or cyanosis. witnessed here in ER The episode was not witnessed (initial seizure was not witnessed at home.  wife heard a loud noise in kitchen around 7am (less than one hour ago) and found pt on kitchen floor.  pt denied any pain prior to having witnessed seizure in ER.). There was no sensation of an aura present. The seizures continued in the ED. The seizure(s) had no focality. Possible causes include change in alcohol use (wife states he normally drinks about 12 beers a day, but has  been trying to cut back over the past week). Possible causes do not include medication or dosage change, sleep deprivation, missed seizure meds or recent illness. Medications administered prior to arrival include lorazepam IV.      Also, per EMS, pt was found in kitchen cooking on propane grill inside.  No one else in house c/o HA or other symptoms.             * No surgery found *      Indwelling Lines/Drains at time of discharge:   Lines/Drains/Airways          No matching active lines, drains, or airways        Hospital Course:   Subsequently in the ER, he was suspected of Alcohol withdrawal Seizure and started on Banana bag and and also got a dose of Ativan IV for seizure. He was also suspected of Carbon Monoxide poisoning and started on Oxygen by NRB mask. ABG in the IB ER could not do any Carboxyhemoglobin level. Pt was accepted for over nite Observation and feels fine. No recurrence of seizure, denies any HA, SOB etc.    Pt with no additional seizure activity overnight. Reports he feels fine and is ready to go home. Patient was seen and examined today and deemed stable for discharge home. Discussed ETOH use and risks associated with daily beer intake. Pt reports he will not drink and will take MVI, Thiamine, and folbic for his health.     Consults:     Significant Diagnostic Studies: Labs:   CMP   Recent Labs  Lab 09/09/17  0755   *   K 3.8   CL 98   CO2 20*      BUN 7   CREATININE 1.0   CALCIUM 9.5   PROT 7.9   ALBUMIN 3.9   BILITOT 0.6   ALKPHOS 53*   AST 23   ALT 21   ANIONGAP 17*   ESTGFRAFRICA >60.0   EGFRNONAA >60.0   , CBC   Recent Labs  Lab 09/09/17  0755   WBC 8.43   HGB 12.2*   HCT 36.0*   *    and All labs within the past 24 hours have been reviewed    Pending Diagnostic Studies:     None        Final Active Diagnoses:    Diagnosis Date Noted POA    PRINCIPAL PROBLEM:  alcohol withdrawal seizure [R56.9] 09/09/2017 Yes    Alcohol abuse [F10.10] 09/09/2017 Yes    Marijuana  abuse [F12.10] 09/09/2017 Yes    Carbon monoxide exposure [Z77.29] 09/09/2017 Yes      Problems Resolved During this Admission:    Diagnosis Date Noted Date Resolved POA      No new Assessment & Plan notes have been filed under this hospital service since the last note was generated.  Service: Hospital Medicine      Discharged Condition: stable    Disposition: Home or Self Care    Follow Up:  Follow-up Information     Kel Smiley MD In 1 week.    Specialty:  Family Medicine  Contact information:  [id number] [facility]  [facility] [id number]  [phone]                 Patient Instructions:     Diet general     Activity as tolerated     Call MD for:  persistent dizziness, light-headedness, or visual disturbances     Call MD for:  increased confusion or weakness       Medications:  Reconciled Home Medications:   Current Discharge Medication List      START taking these medications    Details   folic acid-vit B6-vit B12 2.5-25-2 mg (FOLBIC OR EQUIV) 2.5-25-2 mg Tab Take 1 tablet by mouth once daily.  Qty: 30 tablet, Refills: 0      multivitamin-minerals-lutein (MULTIVITAMIN 50 PLUS) Tab Take 1 tablet by mouth once daily.  Qty: 30 tablet, Refills: 0      thiamine 100 MG tablet Take 1 tablet (100 mg total) by mouth once daily.  Qty: 30 tablet, Refills: 0         CONTINUE these medications which have NOT CHANGED    Details   lisinopril (PRINIVIL,ZESTRIL) 5 MG tablet Take 5 mg by mouth once daily.         STOP taking these medications       fluticasone 50 mcg/actuation DsDv Comments:   Reason for Stopping:         hydrocodone-acetaminophen 7.5-325mg (NORCO) 7.5-325 mg per tablet Comments:   Reason for Stopping:         naproxen (NAPROSYN) 500 MG tablet Comments:   Reason for Stopping:         omeprazole (PRILOSEC) 20 MG capsule Comments:   Reason for Stopping:             Time spent on the discharge of patient: 45 minutes    HOS POC IP DISCHARGE SUMMARY    EZNIA Méndez  Department of  [place] [facility]  [facility]

## 2019-12-02 PROBLEM — I10 HYPERTENSION: Status: ACTIVE | Noted: 2019-12-02

## 2019-12-02 PROBLEM — E55.9 VITAMIN D DEFICIENCY: Status: ACTIVE | Noted: 2019-12-02

## 2019-12-02 PROBLEM — J30.9 ALLERGIC RHINITIS: Status: ACTIVE | Noted: 2019-12-02

## 2019-12-02 PROBLEM — F52.9 PSYCHOSEXUAL DYSFUNCTION: Status: ACTIVE | Noted: 2019-12-02

## 2021-01-24 ENCOUNTER — HOSPITAL ENCOUNTER (INPATIENT)
Facility: HOSPITAL | Age: 61
LOS: 3 days | Discharge: HOME | DRG: 897 | End: 2021-01-27
Attending: FAMILY MEDICINE | Admitting: HOSPITALIST
Payer: COMMERCIAL

## 2021-01-24 DIAGNOSIS — I63.9 CVA (CEREBRAL VASCULAR ACCIDENT): Primary | ICD-10-CM

## 2021-01-24 DIAGNOSIS — R56.9 NEW ONSET SEIZURE: ICD-10-CM

## 2021-01-24 DIAGNOSIS — I63.411 CEREBROVASCULAR ACCIDENT (CVA) DUE TO EMBOLISM OF RIGHT MIDDLE CEREBRAL ARTERY: ICD-10-CM

## 2021-01-24 DIAGNOSIS — I63.9 STROKE: ICD-10-CM

## 2021-01-24 DIAGNOSIS — R55 CONVULSIVE SYNCOPE: ICD-10-CM

## 2021-01-24 DIAGNOSIS — Z86.73 H/O: CVA (CEREBROVASCULAR ACCIDENT): ICD-10-CM

## 2021-01-24 DIAGNOSIS — R56.9 SEIZURE: ICD-10-CM

## 2021-01-24 LAB
ALBUMIN SERPL BCP-MCNC: 4.4 G/DL (ref 3.5–5.2)
ALP SERPL-CCNC: 47 U/L (ref 55–135)
ALT SERPL W/O P-5'-P-CCNC: 19 U/L (ref 10–44)
AMPHET+METHAMPHET UR QL: NEGATIVE
ANION GAP SERPL CALC-SCNC: 20 MMOL/L (ref 8–16)
AST SERPL-CCNC: 22 U/L (ref 10–40)
BACTERIA #/AREA URNS AUTO: ABNORMAL /HPF
BARBITURATES UR QL SCN>200 NG/ML: NEGATIVE
BASOPHILS # BLD AUTO: 0.03 K/UL (ref 0–0.2)
BASOPHILS NFR BLD: 0.2 % (ref 0–1.9)
BENZODIAZ UR QL SCN>200 NG/ML: NEGATIVE
BILIRUB SERPL-MCNC: 0.4 MG/DL (ref 0.1–1)
BILIRUB UR QL STRIP: NEGATIVE
BNP SERPL-MCNC: 25 PG/ML (ref 0–99)
BUN SERPL-MCNC: 12 MG/DL (ref 6–20)
BZE UR QL SCN: NEGATIVE
CALCIUM SERPL-MCNC: 10.1 MG/DL (ref 8.7–10.5)
CANNABINOIDS UR QL SCN: NORMAL
CHLORIDE SERPL-SCNC: 106 MMOL/L (ref 95–110)
CLARITY UR REFRACT.AUTO: CLEAR
CO2 SERPL-SCNC: 17 MMOL/L (ref 23–29)
COLOR UR AUTO: YELLOW
CREAT SERPL-MCNC: 1.3 MG/DL (ref 0.5–1.4)
CREAT UR-MCNC: 105.9 MG/DL (ref 23–375)
CTP QC/QA: YES
DIFFERENTIAL METHOD: ABNORMAL
EOSINOPHIL # BLD AUTO: 0 K/UL (ref 0–0.5)
EOSINOPHIL NFR BLD: 0 % (ref 0–8)
ERYTHROCYTE [DISTWIDTH] IN BLOOD BY AUTOMATED COUNT: 12 % (ref 11.5–14.5)
EST. GFR  (AFRICAN AMERICAN): >60 ML/MIN/1.73 M^2
EST. GFR  (NON AFRICAN AMERICAN): 59.7 ML/MIN/1.73 M^2
ETHANOL SERPL-MCNC: <10 MG/DL
GLUCOSE SERPL-MCNC: 156 MG/DL (ref 70–110)
GLUCOSE UR QL STRIP: NEGATIVE
HCT VFR BLD AUTO: 40.3 % (ref 40–54)
HGB BLD-MCNC: 13.2 G/DL (ref 14–18)
HGB UR QL STRIP: ABNORMAL
IMM GRANULOCYTES # BLD AUTO: 0.09 K/UL (ref 0–0.04)
IMM GRANULOCYTES NFR BLD AUTO: 0.5 % (ref 0–0.5)
INR PPP: 1 (ref 0.8–1.2)
KETONES UR QL STRIP: NEGATIVE
LEUKOCYTE ESTERASE UR QL STRIP: NEGATIVE
LYMPHOCYTES # BLD AUTO: 1 K/UL (ref 1–4.8)
LYMPHOCYTES NFR BLD: 5.2 % (ref 18–48)
MCH RBC QN AUTO: 29.7 PG (ref 27–31)
MCHC RBC AUTO-ENTMCNC: 32.8 G/DL (ref 32–36)
MCV RBC AUTO: 91 FL (ref 82–98)
METHADONE UR QL SCN>300 NG/ML: NEGATIVE
MICROSCOPIC COMMENT: ABNORMAL
MONOCYTES # BLD AUTO: 1.3 K/UL (ref 0.3–1)
MONOCYTES NFR BLD: 6.5 % (ref 4–15)
NEUTROPHILS # BLD AUTO: 16.9 K/UL (ref 1.8–7.7)
NEUTROPHILS NFR BLD: 87.6 % (ref 38–73)
NITRITE UR QL STRIP: NEGATIVE
NRBC BLD-RTO: 0 /100 WBC
OPIATES UR QL SCN: NEGATIVE
PCP UR QL SCN>25 NG/ML: NEGATIVE
PH UR STRIP: 6 [PH] (ref 5–8)
PLATELET # BLD AUTO: 359 K/UL (ref 150–350)
PMV BLD AUTO: 10.2 FL (ref 9.2–12.9)
POCT GLUCOSE: 154 MG/DL (ref 70–110)
POTASSIUM SERPL-SCNC: 4.3 MMOL/L (ref 3.5–5.1)
PROT SERPL-MCNC: 8.1 G/DL (ref 6–8.4)
PROT UR QL STRIP: ABNORMAL
PROTHROMBIN TIME: 10.3 SEC (ref 9–12.5)
RBC # BLD AUTO: 4.44 M/UL (ref 4.6–6.2)
RBC #/AREA URNS AUTO: 5 /HPF (ref 0–4)
SARS-COV-2 RDRP RESP QL NAA+PROBE: NEGATIVE
SODIUM SERPL-SCNC: 143 MMOL/L (ref 136–145)
SP GR UR STRIP: >=1.03 (ref 1–1.03)
SQUAMOUS #/AREA URNS AUTO: 0 /HPF
TOXICOLOGY INFORMATION: NORMAL
TROPONIN I SERPL DL<=0.01 NG/ML-MCNC: 0.01 NG/ML (ref 0–0.03)
TSH SERPL DL<=0.005 MIU/L-ACNC: 0.67 UIU/ML (ref 0.4–4)
URATE CRY UR QL COMP ASSIST: ABNORMAL
URN SPEC COLLECT METH UR: ABNORMAL
UROBILINOGEN UR STRIP-ACNC: NEGATIVE EU/DL
WBC # BLD AUTO: 19.3 K/UL (ref 3.9–12.7)
WBC #/AREA URNS AUTO: 1 /HPF (ref 0–5)

## 2021-01-24 PROCEDURE — U0002 COVID-19 LAB TEST NON-CDC: HCPCS | Mod: ER | Performed by: EMERGENCY MEDICINE

## 2021-01-24 PROCEDURE — 93010 ELECTROCARDIOGRAM REPORT: CPT | Mod: ,,, | Performed by: INTERNAL MEDICINE

## 2021-01-24 PROCEDURE — 63600175 PHARM REV CODE 636 W HCPCS: Mod: ER | Performed by: EMERGENCY MEDICINE

## 2021-01-24 PROCEDURE — 84484 ASSAY OF TROPONIN QUANT: CPT | Mod: ER

## 2021-01-24 PROCEDURE — G0427 PR INPT TELEHEALTH CON 70/>M: ICD-10-PCS | Mod: GT,,, | Performed by: PSYCHIATRY & NEUROLOGY

## 2021-01-24 PROCEDURE — 25000003 PHARM REV CODE 250: Mod: ER | Performed by: NURSE PRACTITIONER

## 2021-01-24 PROCEDURE — G0427 INPT/ED TELECONSULT70: HCPCS | Mod: GT,,, | Performed by: PSYCHIATRY & NEUROLOGY

## 2021-01-24 PROCEDURE — 93005 ELECTROCARDIOGRAM TRACING: CPT | Mod: ER

## 2021-01-24 PROCEDURE — 99291 CRITICAL CARE FIRST HOUR: CPT | Mod: 25,ER

## 2021-01-24 PROCEDURE — 84443 ASSAY THYROID STIM HORMONE: CPT | Mod: ER

## 2021-01-24 PROCEDURE — 80307 DRUG TEST PRSMV CHEM ANLYZR: CPT | Mod: ER

## 2021-01-24 PROCEDURE — 85025 COMPLETE CBC W/AUTO DIFF WBC: CPT | Mod: ER

## 2021-01-24 PROCEDURE — G0378 HOSPITAL OBSERVATION PER HR: HCPCS | Mod: ER

## 2021-01-24 PROCEDURE — 93010 EKG 12-LEAD: ICD-10-PCS | Mod: ,,, | Performed by: INTERNAL MEDICINE

## 2021-01-24 PROCEDURE — 96365 THER/PROPH/DIAG IV INF INIT: CPT | Mod: ER

## 2021-01-24 PROCEDURE — 86703 HIV-1/HIV-2 1 RESULT ANTBDY: CPT

## 2021-01-24 PROCEDURE — 83880 ASSAY OF NATRIURETIC PEPTIDE: CPT | Mod: ER

## 2021-01-24 PROCEDURE — 85610 PROTHROMBIN TIME: CPT | Mod: ER

## 2021-01-24 PROCEDURE — 11000001 HC ACUTE MED/SURG PRIVATE ROOM

## 2021-01-24 PROCEDURE — 81000 URINALYSIS NONAUTO W/SCOPE: CPT | Mod: ER,59

## 2021-01-24 PROCEDURE — 80061 LIPID PANEL: CPT

## 2021-01-24 PROCEDURE — 25500020 PHARM REV CODE 255: Mod: ER | Performed by: EMERGENCY MEDICINE

## 2021-01-24 PROCEDURE — 82077 ASSAY SPEC XCP UR&BREATH IA: CPT | Mod: ER

## 2021-01-24 PROCEDURE — 86803 HEPATITIS C AB TEST: CPT

## 2021-01-24 PROCEDURE — 80053 COMPREHEN METABOLIC PANEL: CPT | Mod: ER

## 2021-01-24 RX ORDER — LEVETIRACETAM 10 MG/ML
1000 INJECTION INTRAVASCULAR
Status: COMPLETED | OUTPATIENT
Start: 2021-01-24 | End: 2021-01-24

## 2021-01-24 RX ADMIN — IOHEXOL 100 ML: 350 INJECTION, SOLUTION INTRAVENOUS at 04:01

## 2021-01-24 RX ADMIN — ACETAMINOPHEN 650 MG: 325 SUPPOSITORY RECTAL at 08:01

## 2021-01-24 RX ADMIN — LEVETIRACETAM INJECTION 1000 MG: 10 INJECTION INTRAVENOUS at 03:01

## 2021-01-25 LAB
ALBUMIN SERPL BCP-MCNC: 4.1 G/DL (ref 3.5–5.2)
ALP SERPL-CCNC: 45 U/L (ref 55–135)
ALT SERPL W/O P-5'-P-CCNC: 24 U/L (ref 10–44)
ANION GAP SERPL CALC-SCNC: 11 MMOL/L (ref 8–16)
AST SERPL-CCNC: 73 U/L (ref 10–40)
BASOPHILS # BLD AUTO: 0.04 K/UL (ref 0–0.2)
BASOPHILS NFR BLD: 0.3 % (ref 0–1.9)
BILIRUB SERPL-MCNC: 1.1 MG/DL (ref 0.1–1)
BUN SERPL-MCNC: 12 MG/DL (ref 6–20)
CALCIUM SERPL-MCNC: 9.5 MG/DL (ref 8.7–10.5)
CHLORIDE SERPL-SCNC: 107 MMOL/L (ref 95–110)
CHOLEST SERPL-MCNC: 165 MG/DL (ref 120–199)
CHOLEST/HDLC SERPL: 2 {RATIO} (ref 2–5)
CO2 SERPL-SCNC: 25 MMOL/L (ref 23–29)
CREAT SERPL-MCNC: 1.2 MG/DL (ref 0.5–1.4)
DIFFERENTIAL METHOD: ABNORMAL
EOSINOPHIL # BLD AUTO: 0 K/UL (ref 0–0.5)
EOSINOPHIL NFR BLD: 0 % (ref 0–8)
ERYTHROCYTE [DISTWIDTH] IN BLOOD BY AUTOMATED COUNT: 12.4 % (ref 11.5–14.5)
EST. GFR  (AFRICAN AMERICAN): >60 ML/MIN/1.73 M^2
EST. GFR  (NON AFRICAN AMERICAN): >60 ML/MIN/1.73 M^2
ESTIMATED AVG GLUCOSE: 80 MG/DL (ref 68–131)
FOLATE SERPL-MCNC: 9.3 NG/ML (ref 4–24)
GLUCOSE SERPL-MCNC: 107 MG/DL (ref 70–110)
HBA1C MFR BLD HPLC: 4.4 % (ref 4–5.6)
HCT VFR BLD AUTO: 41 % (ref 40–54)
HCV AB SERPL QL IA: NEGATIVE
HDLC SERPL-MCNC: 82 MG/DL (ref 40–75)
HDLC SERPL: 49.7 % (ref 20–50)
HGB BLD-MCNC: 13 G/DL (ref 14–18)
HIV 1+2 AB+HIV1 P24 AG SERPL QL IA: NEGATIVE
IMM GRANULOCYTES # BLD AUTO: 0.05 K/UL (ref 0–0.04)
IMM GRANULOCYTES NFR BLD AUTO: 0.3 % (ref 0–0.5)
LDLC SERPL CALC-MCNC: 50.4 MG/DL (ref 63–159)
LYMPHOCYTES # BLD AUTO: 1.7 K/UL (ref 1–4.8)
LYMPHOCYTES NFR BLD: 11.3 % (ref 18–48)
MAGNESIUM SERPL-MCNC: 2.8 MG/DL (ref 1.6–2.6)
MCH RBC QN AUTO: 29 PG (ref 27–31)
MCHC RBC AUTO-ENTMCNC: 31.7 G/DL (ref 32–36)
MCV RBC AUTO: 92 FL (ref 82–98)
MONOCYTES # BLD AUTO: 1.3 K/UL (ref 0.3–1)
MONOCYTES NFR BLD: 9.1 % (ref 4–15)
NEUTROPHILS # BLD AUTO: 11.6 K/UL (ref 1.8–7.7)
NEUTROPHILS NFR BLD: 79 % (ref 38–73)
NONHDLC SERPL-MCNC: 83 MG/DL
NRBC BLD-RTO: 0 /100 WBC
PLATELET # BLD AUTO: 297 K/UL (ref 150–350)
PMV BLD AUTO: 10.3 FL (ref 9.2–12.9)
POTASSIUM SERPL-SCNC: 3.9 MMOL/L (ref 3.5–5.1)
PROT SERPL-MCNC: 8 G/DL (ref 6–8.4)
RBC # BLD AUTO: 4.48 M/UL (ref 4.6–6.2)
SODIUM SERPL-SCNC: 143 MMOL/L (ref 136–145)
TRIGL SERPL-MCNC: 163 MG/DL (ref 30–150)
VIT B12 SERPL-MCNC: 755 PG/ML (ref 210–950)
WBC # BLD AUTO: 14.71 K/UL (ref 3.9–12.7)

## 2021-01-25 PROCEDURE — 97116 GAIT TRAINING THERAPY: CPT

## 2021-01-25 PROCEDURE — 99223 1ST HOSP IP/OBS HIGH 75: CPT | Mod: ,,, | Performed by: PSYCHIATRY & NEUROLOGY

## 2021-01-25 PROCEDURE — 83735 ASSAY OF MAGNESIUM: CPT

## 2021-01-25 PROCEDURE — 97165 OT EVAL LOW COMPLEX 30 MIN: CPT

## 2021-01-25 PROCEDURE — 92523 SPEECH SOUND LANG COMPREHEN: CPT

## 2021-01-25 PROCEDURE — 95819 EEG AWAKE AND ASLEEP: CPT

## 2021-01-25 PROCEDURE — 11000001 HC ACUTE MED/SURG PRIVATE ROOM

## 2021-01-25 PROCEDURE — 97161 PT EVAL LOW COMPLEX 20 MIN: CPT

## 2021-01-25 PROCEDURE — 80053 COMPREHEN METABOLIC PANEL: CPT

## 2021-01-25 PROCEDURE — 92610 EVALUATE SWALLOWING FUNCTION: CPT

## 2021-01-25 PROCEDURE — 82607 VITAMIN B-12: CPT

## 2021-01-25 PROCEDURE — 95816 EEG AWAKE AND DROWSY: CPT | Mod: 26,,, | Performed by: PSYCHIATRY & NEUROLOGY

## 2021-01-25 PROCEDURE — 99223 PR INITIAL HOSPITAL CARE,LEVL III: ICD-10-PCS | Mod: ,,, | Performed by: PSYCHIATRY & NEUROLOGY

## 2021-01-25 PROCEDURE — 95816 PR EEG,W/AWAKE & DROWSY RECORD: ICD-10-PCS | Mod: 26,,, | Performed by: PSYCHIATRY & NEUROLOGY

## 2021-01-25 PROCEDURE — 36415 COLL VENOUS BLD VENIPUNCTURE: CPT

## 2021-01-25 PROCEDURE — 63600175 PHARM REV CODE 636 W HCPCS: Performed by: NURSE PRACTITIONER

## 2021-01-25 PROCEDURE — 82746 ASSAY OF FOLIC ACID SERUM: CPT

## 2021-01-25 PROCEDURE — 85025 COMPLETE CBC W/AUTO DIFF WBC: CPT

## 2021-01-25 PROCEDURE — 83036 HEMOGLOBIN GLYCOSYLATED A1C: CPT

## 2021-01-25 PROCEDURE — 97530 THERAPEUTIC ACTIVITIES: CPT

## 2021-01-25 PROCEDURE — 25000003 PHARM REV CODE 250: Performed by: NURSE PRACTITIONER

## 2021-01-25 RX ORDER — LORAZEPAM 2 MG/ML
1 INJECTION INTRAMUSCULAR
Status: DISCONTINUED | OUTPATIENT
Start: 2021-01-25 | End: 2021-01-27 | Stop reason: HOSPADM

## 2021-01-25 RX ORDER — TALC
6 POWDER (GRAM) TOPICAL NIGHTLY PRN
Status: DISCONTINUED | OUTPATIENT
Start: 2021-01-25 | End: 2021-01-27 | Stop reason: HOSPADM

## 2021-01-25 RX ORDER — ENOXAPARIN SODIUM 100 MG/ML
40 INJECTION SUBCUTANEOUS EVERY 24 HOURS
Status: DISCONTINUED | OUTPATIENT
Start: 2021-01-25 | End: 2021-01-27 | Stop reason: HOSPADM

## 2021-01-25 RX ORDER — ATORVASTATIN CALCIUM 40 MG/1
40 TABLET, FILM COATED ORAL NIGHTLY
Status: DISCONTINUED | OUTPATIENT
Start: 2021-01-25 | End: 2021-01-27 | Stop reason: HOSPADM

## 2021-01-25 RX ORDER — NAPROXEN SODIUM 220 MG/1
81 TABLET, FILM COATED ORAL DAILY
Status: DISCONTINUED | OUTPATIENT
Start: 2021-01-25 | End: 2021-01-27 | Stop reason: HOSPADM

## 2021-01-25 RX ORDER — HYDRALAZINE HYDROCHLORIDE 20 MG/ML
10 INJECTION INTRAMUSCULAR; INTRAVENOUS EVERY 6 HOURS PRN
Status: DISCONTINUED | OUTPATIENT
Start: 2021-01-25 | End: 2021-01-27 | Stop reason: HOSPADM

## 2021-01-25 RX ORDER — SODIUM CHLORIDE 0.9 % (FLUSH) 0.9 %
10 SYRINGE (ML) INJECTION
Status: DISCONTINUED | OUTPATIENT
Start: 2021-01-25 | End: 2021-01-27 | Stop reason: HOSPADM

## 2021-01-25 RX ADMIN — ASPIRIN 81 MG 81 MG: 81 TABLET ORAL at 08:01

## 2021-01-25 RX ADMIN — ATORVASTATIN CALCIUM 40 MG: 40 TABLET, FILM COATED ORAL at 09:01

## 2021-01-25 RX ADMIN — ENOXAPARIN SODIUM 40 MG: 100 INJECTION SUBCUTANEOUS at 05:01

## 2021-01-26 PROBLEM — R55 CONVULSIVE SYNCOPE: Status: ACTIVE | Noted: 2021-01-24

## 2021-01-26 LAB
ALBUMIN SERPL BCP-MCNC: 4 G/DL (ref 3.5–5.2)
ALP SERPL-CCNC: 50 U/L (ref 55–135)
ALT SERPL W/O P-5'-P-CCNC: 61 U/L (ref 10–44)
ANION GAP SERPL CALC-SCNC: 13 MMOL/L (ref 8–16)
AORTIC ROOT ANNULUS: 3.09 CM
ASCENDING AORTA: 2.64 CM
AST SERPL-CCNC: 193 U/L (ref 10–40)
AV INDEX (PROSTH): 0.84
AV MEAN GRADIENT: 4 MMHG
AV PEAK GRADIENT: 7 MMHG
AV VALVE AREA: 2.31 CM2
AV VELOCITY RATIO: 0.82
BASOPHILS # BLD AUTO: 0.04 K/UL (ref 0–0.2)
BASOPHILS NFR BLD: 0.3 % (ref 0–1.9)
BILIRUB SERPL-MCNC: 1.1 MG/DL (ref 0.1–1)
BSA FOR ECHO PROCEDURE: 1.75 M2
BUN SERPL-MCNC: 18 MG/DL (ref 6–20)
CALCIUM SERPL-MCNC: 9.3 MG/DL (ref 8.7–10.5)
CHLORIDE SERPL-SCNC: 105 MMOL/L (ref 95–110)
CO2 SERPL-SCNC: 22 MMOL/L (ref 23–29)
CREAT SERPL-MCNC: 1 MG/DL (ref 0.5–1.4)
CV ECHO LV RWT: 0.49 CM
DIFFERENTIAL METHOD: ABNORMAL
DOP CALC AO PEAK VEL: 1.34 M/S
DOP CALC AO VTI: 18.39 CM
DOP CALC LVOT AREA: 2.7 CM2
DOP CALC LVOT DIAMETER: 1.87 CM
DOP CALC LVOT PEAK VEL: 1.1 M/S
DOP CALC LVOT STROKE VOLUME: 42.52 CM3
DOP CALC RVOT PEAK VEL: 0.68 M/S
DOP CALC RVOT VTI: 8.85 CM
DOP CALCLVOT PEAK VEL VTI: 15.49 CM
E WAVE DECELERATION TIME: 161.9 MSEC
E/A RATIO: 0.78
E/E' RATIO: 4.64 M/S
ECHO LV POSTERIOR WALL: 1.02 CM (ref 0.6–1.1)
EOSINOPHIL # BLD AUTO: 0 K/UL (ref 0–0.5)
EOSINOPHIL NFR BLD: 0.3 % (ref 0–8)
ERYTHROCYTE [DISTWIDTH] IN BLOOD BY AUTOMATED COUNT: 12.2 % (ref 11.5–14.5)
EST. GFR  (AFRICAN AMERICAN): >60 ML/MIN/1.73 M^2
EST. GFR  (NON AFRICAN AMERICAN): >60 ML/MIN/1.73 M^2
FRACTIONAL SHORTENING: 33 % (ref 28–44)
GLUCOSE SERPL-MCNC: 144 MG/DL (ref 70–110)
HCT VFR BLD AUTO: 44 % (ref 40–54)
HGB BLD-MCNC: 13.6 G/DL (ref 14–18)
IMM GRANULOCYTES # BLD AUTO: 0.04 K/UL (ref 0–0.04)
IMM GRANULOCYTES NFR BLD AUTO: 0.3 % (ref 0–0.5)
INTERVENTRICULAR SEPTUM: 1.03 CM (ref 0.6–1.1)
IVRT: 72.66 MSEC
LA MAJOR: 3.83 CM
LA MINOR: 3.62 CM
LA WIDTH: 3.47 CM
LEFT ATRIUM SIZE: 2.73 CM
LEFT ATRIUM VOLUME INDEX: 17 ML/M2
LEFT ATRIUM VOLUME: 29.97 CM3
LEFT INTERNAL DIMENSION IN SYSTOLE: 2.78 CM (ref 2.1–4)
LEFT VENTRICLE DIASTOLIC VOLUME INDEX: 43.6 ML/M2
LEFT VENTRICLE DIASTOLIC VOLUME: 76.74 ML
LEFT VENTRICLE MASS INDEX: 80 G/M2
LEFT VENTRICLE SYSTOLIC VOLUME INDEX: 16.6 ML/M2
LEFT VENTRICLE SYSTOLIC VOLUME: 29.15 ML
LEFT VENTRICULAR INTERNAL DIMENSION IN DIASTOLE: 4.16 CM (ref 3.5–6)
LEFT VENTRICULAR MASS: 139.95 G
LV LATERAL E/E' RATIO: 4.64 M/S
LV SEPTAL E/E' RATIO: 4.64 M/S
LYMPHOCYTES # BLD AUTO: 1.3 K/UL (ref 1–4.8)
LYMPHOCYTES NFR BLD: 10.9 % (ref 18–48)
MAGNESIUM SERPL-MCNC: 2.5 MG/DL (ref 1.6–2.6)
MCH RBC QN AUTO: 28.7 PG (ref 27–31)
MCHC RBC AUTO-ENTMCNC: 30.9 G/DL (ref 32–36)
MCV RBC AUTO: 93 FL (ref 82–98)
MONOCYTES # BLD AUTO: 0.7 K/UL (ref 0.3–1)
MONOCYTES NFR BLD: 5.5 % (ref 4–15)
MV PEAK A VEL: 0.65 M/S
MV PEAK E VEL: 0.51 M/S
NEUTROPHILS # BLD AUTO: 9.7 K/UL (ref 1.8–7.7)
NEUTROPHILS NFR BLD: 82.7 % (ref 38–73)
NRBC BLD-RTO: 0 /100 WBC
PISA MRMAX VEL: 0.04 M/S
PISA TR MAX VEL: 2.19 M/S
PLATELET # BLD AUTO: 319 K/UL (ref 150–350)
PMV BLD AUTO: 10 FL (ref 9.2–12.9)
POTASSIUM SERPL-SCNC: 4 MMOL/L (ref 3.5–5.1)
PROT SERPL-MCNC: 7.8 G/DL (ref 6–8.4)
PV MEAN GRADIENT: 1 MMHG
RA MAJOR: 3.39 CM
RA PRESSURE: 3 MMHG
RA WIDTH: 2.41 CM
RBC # BLD AUTO: 4.74 M/UL (ref 4.6–6.2)
SINUS: 2.83 CM
SODIUM SERPL-SCNC: 140 MMOL/L (ref 136–145)
STJ: 2.69 CM
TDI LATERAL: 0.11 M/S
TDI SEPTAL: 0.11 M/S
TDI: 0.11 M/S
TR MAX PG: 19 MMHG
TRICUSPID ANNULAR PLANE SYSTOLIC EXCURSION: 2.03 CM
TV REST PULMONARY ARTERY PRESSURE: 22 MMHG
WBC # BLD AUTO: 11.73 K/UL (ref 3.9–12.7)

## 2021-01-26 PROCEDURE — 99233 SBSQ HOSP IP/OBS HIGH 50: CPT | Mod: ,,, | Performed by: PSYCHIATRY & NEUROLOGY

## 2021-01-26 PROCEDURE — 80053 COMPREHEN METABOLIC PANEL: CPT

## 2021-01-26 PROCEDURE — 85025 COMPLETE CBC W/AUTO DIFF WBC: CPT

## 2021-01-26 PROCEDURE — 63600175 PHARM REV CODE 636 W HCPCS: Performed by: NURSE PRACTITIONER

## 2021-01-26 PROCEDURE — 94761 N-INVAS EAR/PLS OXIMETRY MLT: CPT

## 2021-01-26 PROCEDURE — 25000003 PHARM REV CODE 250: Performed by: NURSE PRACTITIONER

## 2021-01-26 PROCEDURE — 83735 ASSAY OF MAGNESIUM: CPT

## 2021-01-26 PROCEDURE — 99233 PR SUBSEQUENT HOSPITAL CARE,LEVL III: ICD-10-PCS | Mod: ,,, | Performed by: PSYCHIATRY & NEUROLOGY

## 2021-01-26 PROCEDURE — 36415 COLL VENOUS BLD VENIPUNCTURE: CPT

## 2021-01-26 PROCEDURE — 11000001 HC ACUTE MED/SURG PRIVATE ROOM

## 2021-01-26 RX ORDER — GADOBUTROL 604.72 MG/ML
10 INJECTION INTRAVENOUS
Status: COMPLETED | OUTPATIENT
Start: 2021-01-26 | End: 2021-01-26

## 2021-01-26 RX ADMIN — ATORVASTATIN CALCIUM 40 MG: 40 TABLET, FILM COATED ORAL at 09:01

## 2021-01-26 RX ADMIN — ASPIRIN 81 MG 81 MG: 81 TABLET ORAL at 08:01

## 2021-01-26 RX ADMIN — ENOXAPARIN SODIUM 40 MG: 100 INJECTION SUBCUTANEOUS at 04:01

## 2021-01-26 RX ADMIN — GADOBUTROL 6 ML: 604.72 INJECTION INTRAVENOUS at 06:01

## 2021-01-27 VITALS
DIASTOLIC BLOOD PRESSURE: 87 MMHG | SYSTOLIC BLOOD PRESSURE: 147 MMHG | BODY MASS INDEX: 20.44 KG/M2 | RESPIRATION RATE: 18 BRPM | TEMPERATURE: 98 F | HEART RATE: 98 BPM | HEIGHT: 69 IN | OXYGEN SATURATION: 97 % | WEIGHT: 138 LBS

## 2021-01-27 LAB
ALBUMIN SERPL BCP-MCNC: 4 G/DL (ref 3.5–5.2)
ALP SERPL-CCNC: 52 U/L (ref 55–135)
ALT SERPL W/O P-5'-P-CCNC: 92 U/L (ref 10–44)
ANION GAP SERPL CALC-SCNC: 16 MMOL/L (ref 8–16)
AST SERPL-CCNC: 263 U/L (ref 10–40)
BASOPHILS # BLD AUTO: 0.03 K/UL (ref 0–0.2)
BASOPHILS NFR BLD: 0.3 % (ref 0–1.9)
BILIRUB SERPL-MCNC: 1 MG/DL (ref 0.1–1)
BUN SERPL-MCNC: 19 MG/DL (ref 6–20)
CALCIUM SERPL-MCNC: 9.3 MG/DL (ref 8.7–10.5)
CHLORIDE SERPL-SCNC: 105 MMOL/L (ref 95–110)
CO2 SERPL-SCNC: 24 MMOL/L (ref 23–29)
CREAT SERPL-MCNC: 0.9 MG/DL (ref 0.5–1.4)
DIFFERENTIAL METHOD: ABNORMAL
EOSINOPHIL # BLD AUTO: 0 K/UL (ref 0–0.5)
EOSINOPHIL NFR BLD: 0.2 % (ref 0–8)
ERYTHROCYTE [DISTWIDTH] IN BLOOD BY AUTOMATED COUNT: 11.9 % (ref 11.5–14.5)
EST. GFR  (AFRICAN AMERICAN): >60 ML/MIN/1.73 M^2
EST. GFR  (NON AFRICAN AMERICAN): >60 ML/MIN/1.73 M^2
GLUCOSE SERPL-MCNC: 107 MG/DL (ref 70–110)
HCT VFR BLD AUTO: 43.1 % (ref 40–54)
HGB BLD-MCNC: 13.5 G/DL (ref 14–18)
IMM GRANULOCYTES # BLD AUTO: 0.04 K/UL (ref 0–0.04)
IMM GRANULOCYTES NFR BLD AUTO: 0.4 % (ref 0–0.5)
LYMPHOCYTES # BLD AUTO: 1.1 K/UL (ref 1–4.8)
LYMPHOCYTES NFR BLD: 9.8 % (ref 18–48)
MAGNESIUM SERPL-MCNC: 2.4 MG/DL (ref 1.6–2.6)
MCH RBC QN AUTO: 29 PG (ref 27–31)
MCHC RBC AUTO-ENTMCNC: 31.3 G/DL (ref 32–36)
MCV RBC AUTO: 93 FL (ref 82–98)
MONOCYTES # BLD AUTO: 0.7 K/UL (ref 0.3–1)
MONOCYTES NFR BLD: 6.8 % (ref 4–15)
NEUTROPHILS # BLD AUTO: 9 K/UL (ref 1.8–7.7)
NEUTROPHILS NFR BLD: 82.5 % (ref 38–73)
NRBC BLD-RTO: 0 /100 WBC
PLATELET # BLD AUTO: 301 K/UL (ref 150–350)
PMV BLD AUTO: 11.1 FL (ref 9.2–12.9)
POTASSIUM SERPL-SCNC: 3.5 MMOL/L (ref 3.5–5.1)
PROT SERPL-MCNC: 8 G/DL (ref 6–8.4)
RBC # BLD AUTO: 4.65 M/UL (ref 4.6–6.2)
SODIUM SERPL-SCNC: 145 MMOL/L (ref 136–145)
WBC # BLD AUTO: 10.85 K/UL (ref 3.9–12.7)

## 2021-01-27 PROCEDURE — 25000003 PHARM REV CODE 250: Performed by: NURSE PRACTITIONER

## 2021-01-27 PROCEDURE — 36415 COLL VENOUS BLD VENIPUNCTURE: CPT

## 2021-01-27 PROCEDURE — 94761 N-INVAS EAR/PLS OXIMETRY MLT: CPT

## 2021-01-27 PROCEDURE — 85025 COMPLETE CBC W/AUTO DIFF WBC: CPT

## 2021-01-27 PROCEDURE — 80053 COMPREHEN METABOLIC PANEL: CPT

## 2021-01-27 PROCEDURE — 83735 ASSAY OF MAGNESIUM: CPT

## 2021-01-27 RX ORDER — NAPROXEN SODIUM 220 MG/1
81 TABLET, FILM COATED ORAL DAILY
Qty: 360 TABLET | Refills: 0 | Status: SHIPPED | OUTPATIENT
Start: 2021-01-28 | End: 2022-08-18 | Stop reason: SDUPTHER

## 2021-01-27 RX ORDER — ATORVASTATIN CALCIUM 40 MG/1
40 TABLET, FILM COATED ORAL NIGHTLY
Qty: 90 TABLET | Refills: 3 | Status: SHIPPED | OUTPATIENT
Start: 2021-01-27 | End: 2021-08-17 | Stop reason: SDUPTHER

## 2021-01-27 RX ADMIN — ASPIRIN 81 MG 81 MG: 81 TABLET ORAL at 08:01

## 2021-01-28 ENCOUNTER — PATIENT OUTREACH (OUTPATIENT)
Dept: ADMINISTRATIVE | Facility: CLINIC | Age: 61
End: 2021-01-28

## 2021-05-15 ENCOUNTER — HOSPITAL ENCOUNTER (EMERGENCY)
Facility: HOSPITAL | Age: 61
Discharge: HOME | End: 2021-05-15
Attending: EMERGENCY MEDICINE
Payer: MEDICARE

## 2021-05-15 VITALS
BODY MASS INDEX: 19.94 KG/M2 | RESPIRATION RATE: 18 BRPM | SYSTOLIC BLOOD PRESSURE: 123 MMHG | WEIGHT: 135 LBS | TEMPERATURE: 97 F | HEART RATE: 85 BPM | OXYGEN SATURATION: 99 % | DIASTOLIC BLOOD PRESSURE: 75 MMHG

## 2021-05-15 DIAGNOSIS — R41.82 ALTERED MENTAL STATUS: ICD-10-CM

## 2021-05-15 DIAGNOSIS — R56.9 SEIZURE-LIKE ACTIVITY: Primary | ICD-10-CM

## 2021-05-15 LAB
ANION GAP SERPL CALC-SCNC: 22 MMOL/L (ref 8–16)
BASOPHILS # BLD AUTO: 0.03 K/UL (ref 0–0.2)
BASOPHILS NFR BLD: 0.3 % (ref 0–1.9)
BUN SERPL-MCNC: 9 MG/DL (ref 6–20)
CALCIUM SERPL-MCNC: 9.1 MG/DL (ref 8.7–10.5)
CHLORIDE SERPL-SCNC: 102 MMOL/L (ref 95–110)
CO2 SERPL-SCNC: 13 MMOL/L (ref 23–29)
CREAT SERPL-MCNC: 1.2 MG/DL (ref 0.5–1.4)
DIFFERENTIAL METHOD: ABNORMAL
EOSINOPHIL # BLD AUTO: 0.2 K/UL (ref 0–0.5)
EOSINOPHIL NFR BLD: 2.3 % (ref 0–8)
ERYTHROCYTE [DISTWIDTH] IN BLOOD BY AUTOMATED COUNT: 12.5 % (ref 11.5–14.5)
EST. GFR  (AFRICAN AMERICAN): >60 ML/MIN/1.73 M^2
EST. GFR  (NON AFRICAN AMERICAN): >60 ML/MIN/1.73 M^2
GLUCOSE SERPL-MCNC: 92 MG/DL (ref 70–110)
HCT VFR BLD AUTO: 38.1 % (ref 40–54)
HGB BLD-MCNC: 12.1 G/DL (ref 14–18)
IMM GRANULOCYTES # BLD AUTO: 0.03 K/UL (ref 0–0.04)
IMM GRANULOCYTES NFR BLD AUTO: 0.3 % (ref 0–0.5)
LYMPHOCYTES # BLD AUTO: 4.4 K/UL (ref 1–4.8)
LYMPHOCYTES NFR BLD: 42.7 % (ref 18–48)
MCH RBC QN AUTO: 28.5 PG (ref 27–31)
MCHC RBC AUTO-ENTMCNC: 31.8 G/DL (ref 32–36)
MCV RBC AUTO: 90 FL (ref 82–98)
MONOCYTES # BLD AUTO: 1 K/UL (ref 0.3–1)
MONOCYTES NFR BLD: 9.6 % (ref 4–15)
NEUTROPHILS # BLD AUTO: 4.6 K/UL (ref 1.8–7.7)
NEUTROPHILS NFR BLD: 44.8 % (ref 38–73)
NRBC BLD-RTO: 0 /100 WBC
PLATELET # BLD AUTO: 314 K/UL (ref 150–450)
PMV BLD AUTO: 9.6 FL (ref 9.2–12.9)
POTASSIUM SERPL-SCNC: 3.6 MMOL/L (ref 3.5–5.1)
RBC # BLD AUTO: 4.25 M/UL (ref 4.6–6.2)
SODIUM SERPL-SCNC: 137 MMOL/L (ref 136–145)
WBC # BLD AUTO: 10.31 K/UL (ref 3.9–12.7)

## 2021-05-15 PROCEDURE — 80048 BASIC METABOLIC PNL TOTAL CA: CPT | Mod: ER | Performed by: EMERGENCY MEDICINE

## 2021-05-15 PROCEDURE — 96360 HYDRATION IV INFUSION INIT: CPT | Mod: ER

## 2021-05-15 PROCEDURE — 99284 EMERGENCY DEPT VISIT MOD MDM: CPT | Mod: 25,ER

## 2021-05-15 PROCEDURE — 25000003 PHARM REV CODE 250: Mod: ER | Performed by: EMERGENCY MEDICINE

## 2021-05-15 PROCEDURE — 93010 ELECTROCARDIOGRAM REPORT: CPT | Mod: ,,, | Performed by: INTERNAL MEDICINE

## 2021-05-15 PROCEDURE — 93010 EKG 12-LEAD: ICD-10-PCS | Mod: ,,, | Performed by: INTERNAL MEDICINE

## 2021-05-15 PROCEDURE — 93005 ELECTROCARDIOGRAM TRACING: CPT | Mod: ER

## 2021-05-15 PROCEDURE — 85025 COMPLETE CBC W/AUTO DIFF WBC: CPT | Mod: ER | Performed by: EMERGENCY MEDICINE

## 2021-05-15 RX ORDER — LEVETIRACETAM 500 MG/1
500 TABLET ORAL 2 TIMES DAILY
Qty: 60 TABLET | Refills: 0 | Status: SHIPPED | OUTPATIENT
Start: 2021-05-15 | End: 2021-08-17 | Stop reason: SDUPTHER

## 2021-05-15 RX ORDER — LEVETIRACETAM 250 MG/1
1000 TABLET ORAL
Status: COMPLETED | OUTPATIENT
Start: 2021-05-15 | End: 2021-05-15

## 2021-05-15 RX ORDER — CHLORDIAZEPOXIDE HYDROCHLORIDE 10 MG/1
10 CAPSULE, GELATIN COATED ORAL 3 TIMES DAILY PRN
Qty: 21 CAPSULE | Refills: 0 | Status: SHIPPED | OUTPATIENT
Start: 2021-05-15 | End: 2022-05-19

## 2021-05-15 RX ADMIN — SODIUM CHLORIDE 1000 ML: 0.9 INJECTION, SOLUTION INTRAVENOUS at 10:05

## 2021-05-15 RX ADMIN — LEVETIRACETAM 1000 MG: 250 TABLET ORAL at 10:05

## 2022-12-22 NOTE — ED NOTES
Pt sleeping in bed. NAD, VSS, RR equal and unlabored. Will continue to monitor   Additional Notes: Recheck at April 2023 TBSE